# Patient Record
Sex: MALE | Race: WHITE | NOT HISPANIC OR LATINO | Employment: OTHER | URBAN - METROPOLITAN AREA
[De-identification: names, ages, dates, MRNs, and addresses within clinical notes are randomized per-mention and may not be internally consistent; named-entity substitution may affect disease eponyms.]

---

## 2022-09-07 NOTE — PROGRESS NOTES
ASSESSMENT & PLAN  Patient Instructions     1. Right hip pain    2. Primary osteoarthritis of right hip    3. Lumbar spondylosis      -Patient has right hip pain due to arthritis and low back pain due to multilevel arthritis and degenerative disc disease  -Patient was offered formal physical therapy which she is not interested at this time  -Patient may return to the office at any time for a potential right hip intra-articular cortisone injection  -Call direct clinic number [[phone]] at any time with questions or concerns.    Albert Yeo MD [facility] Orthopedics and Sports Medicine  [facility] [place]          -----    SUBJECTIVE  Carson Zurita is a/an 71 year old male who is seen as a self referral for evaluation of right hip pain. The patient is seen by themselves.    Onset: 2 years(s) ago. Reports insidious onset without acute precipitating event.  Location of Pain: right groin, as well as right sided low back pain   Rating of Pain at worst: 6/10  Rating of Pain Currently: 0/10  Worsened by: pain comes and goes depending on the day, not necessarily worse with activities   Better with: Tylenol and rest   Treatments tried: Tylenol  Quality: sharp  Associated symptoms: no distal numbness or tingling; denies swelling or warmth  Orthopedic history: YES - Date: 40+ years of chronic low back pain, has treated in the past with lumbar injection for bulging disc. 12/30/2004 left hip CHRIS  Relevant surgical history: YES - Date: 12/30/2004 left hip CHRIS  Social history: social history: retired, smokes 1 pack per day     No past medical history on file.  Social History     Socioeconomic History     Marital status:        Patient's past medical, surgical, social, and family histories were reviewed today and no changes are noted.    REVIEW OF SYSTEMS:  10 point ROS is negative other than symptoms noted above in HPI, Past Medical History or as stated below  Constitutional: NEGATIVE for fever, chills,  "change in weight  Skin: NEGATIVE for worrisome rashes, moles or lesions  GI/: NEGATIVE for bowel or bladder changes  Neuro: NEGATIVE for weakness, dizziness or paresthesias    OBJECTIVE:  /80   Ht 1.803 m (5' 11\")   Wt 102.8 kg (226 lb 9.6 oz)   BMI 31.60 kg/m     General: healthy, alert and in no distress  HEENT: no scleral icterus or conjunctival erythema  Skin: no suspicious lesions or rash. No jaundice.  CV: no pedal edema  Resp: normal respiratory effort without conversational dyspnea   Psych: normal mood and affect  Gait: normal steady gait with appropriate coordination and balance  Neuro: Normal light sensory exam of lower extremity  MSK:  RIGHT HIP  Inspection:    No obvious deformity or asymmetry, level pelvis  Palpation:  landmarks are nontender.  Active Range of Motion:     Flexion limited by tightness, IR within normal limits, ER  within normal limits  Strength:    Flexion grossly intact, adduction grossly intact, abduction grossly intact  Special Tests:    Positive: none    Negative: Logroll, resisted gluteus medius provocation, AJ, anterior impingement (FADIR), posterior impingement (EX/AB/ER)    THORACIC/LUMBAR SPINE  Inspection:    No redness, swelling, overlying skin change, gross deformity/asymmetry, scapular winging  Palpation:    Tender about the right para lumbar muscles. Otherwise remainder of landmarks are nontender.  Range of Motion:     Lumbar flexion limited by tightness    Lumbar extension limited by tightness    Right side bend limited by tightness    Left side bend limited by tightness    Right rotation limited by tightness    Left rotation limited by tightness  Strength:    Full strength throughout hips/quads/hamstrings  Special Tests:    Positive: none  Negative: straight leg raise (bilateral)    Independent visualization of the below image:    Personal review of right hip x-ray shows moderate arthritis with joint space narrowing and small osteophytes.  No acute fracture " or dislocation.  Moderate arthritis of the SI joints and degenerative changes of the lumbar spine.        Albert Yeo MD [facility]

## 2022-09-09 ENCOUNTER — OFFICE VISIT (OUTPATIENT)
Dept: ORTHOPEDICS | Facility: CLINIC | Age: 72
End: 2022-09-09
Payer: MEDICARE

## 2022-09-09 VITALS
DIASTOLIC BLOOD PRESSURE: 80 MMHG | SYSTOLIC BLOOD PRESSURE: 139 MMHG | BODY MASS INDEX: 31.72 KG/M2 | HEIGHT: 71 IN | WEIGHT: 226.6 LBS

## 2022-09-09 DIAGNOSIS — M47.816 LUMBAR SPONDYLOSIS: ICD-10-CM

## 2022-09-09 DIAGNOSIS — M16.11 PRIMARY OSTEOARTHRITIS OF RIGHT HIP: ICD-10-CM

## 2022-09-09 DIAGNOSIS — M25.551 RIGHT HIP PAIN: Primary | ICD-10-CM

## 2022-09-09 PROCEDURE — 99204 OFFICE O/P NEW MOD 45 MIN: CPT | Performed by: FAMILY MEDICINE

## 2022-09-09 RX ORDER — GABAPENTIN 600 MG/1
1200 TABLET ORAL 3 TIMES DAILY
COMMUNITY
Start: 2022-09-04

## 2022-09-09 RX ORDER — METOPROLOL TARTRATE 25 MG/1
25 TABLET, FILM COATED ORAL 2 TIMES DAILY
COMMUNITY
Start: 2022-09-01

## 2022-09-09 RX ORDER — LISINOPRIL 10 MG/1
10 TABLET ORAL DAILY
COMMUNITY
Start: 2022-09-01

## 2022-09-09 RX ORDER — SIMVASTATIN 20 MG
20 TABLET ORAL AT BEDTIME
COMMUNITY
Start: 2022-09-01

## 2022-09-09 RX ORDER — ALLOPURINOL 300 MG/1
300 TABLET ORAL DAILY
COMMUNITY
Start: 2022-09-01

## 2022-09-09 RX ORDER — TIOTROPIUM BROMIDE 18 UG/1
18 CAPSULE ORAL; RESPIRATORY (INHALATION) DAILY
COMMUNITY
Start: 2022-04-14 | End: 2024-06-25

## 2022-09-09 RX ORDER — DULOXETIN HYDROCHLORIDE 30 MG/1
30 CAPSULE, DELAYED RELEASE ORAL DAILY
COMMUNITY
Start: 2022-07-26 | End: 2023-10-31

## 2022-09-09 RX ORDER — RIVAROXABAN 20 MG/1
20 TABLET, FILM COATED ORAL
COMMUNITY
Start: 2022-07-02

## 2022-09-09 NOTE — PATIENT INSTRUCTIONS
1. Right hip pain    2. Primary osteoarthritis of right hip    3. Lumbar spondylosis      -Patient has right hip pain due to arthritis and low back pain due to multilevel arthritis and degenerative disc disease  -Patient was offered formal physical therapy which she is not interested at this time  -Patient may return to the office at any time for a potential right hip intra-articular cortisone injection  -Call direct clinic number [[phone]] at any time with questions or concerns.    Albert Yeo MD [facility]  [facility] [place]

## 2022-09-09 NOTE — LETTER
9/9/2022         RE: Carson Zurita  [address] [address]        Dear Colleague,    Thank you for referring your patient, Carson Zurita, to the [facility] [place]. Please see a copy of my visit note below.    ASSESSMENT & PLAN  Patient Instructions     1. Right hip pain    2. Primary osteoarthritis of right hip    3. Lumbar spondylosis      -Patient has right hip pain due to arthritis and low back pain due to multilevel arthritis and degenerative disc disease  -Patient was offered formal physical therapy which she is not interested at this time  -Patient may return to the office at any time for a potential right hip intra-articular cortisone injection  -Call direct clinic number [[phone]] at any time with questions or concerns.    Albert Yeo MD [facility] Orthopedics and Sports Medicine  [facility] [place]          -----    SUBJECTIVE  Carson Zurita is a/an 71 year old male who is seen as a self referral for evaluation of right hip pain. The patient is seen by themselves.    Onset: 2 years(s) ago. Reports insidious onset without acute precipitating event.  Location of Pain: right groin, as well as right sided low back pain   Rating of Pain at worst: 6/10  Rating of Pain Currently: 0/10  Worsened by: pain comes and goes depending on the day, not necessarily worse with activities   Better with: Tylenol and rest   Treatments tried: Tylenol  Quality: sharp  Associated symptoms: no distal numbness or tingling; denies swelling or warmth  Orthopedic history: YES - Date: 40+ years of chronic low back pain, has treated in the past with lumbar injection for bulging disc. 12/30/2004 left hip CHRIS  Relevant surgical history: YES - Date: 12/30/2004 left hip CHRIS  Social history: social history: retired, smokes 1 pack per day     No past medical history on file.  Social History     Socioeconomic History     Marital status:        Patient's past  "medical, surgical, social, and family histories were reviewed today and no changes are noted.    REVIEW OF SYSTEMS:  10 point ROS is negative other than symptoms noted above in HPI, Past Medical History or as stated below  Constitutional: NEGATIVE for fever, chills, change in weight  Skin: NEGATIVE for worrisome rashes, moles or lesions  GI/: NEGATIVE for bowel or bladder changes  Neuro: NEGATIVE for weakness, dizziness or paresthesias    OBJECTIVE:  /80   Ht 1.803 m (5' 11\")   Wt 102.8 kg (226 lb 9.6 oz)   BMI 31.60 kg/m     General: healthy, alert and in no distress  HEENT: no scleral icterus or conjunctival erythema  Skin: no suspicious lesions or rash. No jaundice.  CV: no pedal edema  Resp: normal respiratory effort without conversational dyspnea   Psych: normal mood and affect  Gait: normal steady gait with appropriate coordination and balance  Neuro: Normal light sensory exam of lower extremity  MSK:  RIGHT HIP  Inspection:    No obvious deformity or asymmetry, level pelvis  Palpation:  landmarks are nontender.  Active Range of Motion:     Flexion limited by tightness, IR within normal limits, ER  within normal limits  Strength:    Flexion grossly intact, adduction grossly intact, abduction grossly intact  Special Tests:    Positive: none    Negative: Logroll, resisted gluteus medius provocation, AJ, anterior impingement (FADIR), posterior impingement (EX/AB/ER)    THORACIC/LUMBAR SPINE  Inspection:    No redness, swelling, overlying skin change, gross deformity/asymmetry, scapular winging  Palpation:    Tender about the right para lumbar muscles. Otherwise remainder of landmarks are nontender.  Range of Motion:     Lumbar flexion limited by tightness    Lumbar extension limited by tightness    Right side bend limited by tightness    Left side bend limited by tightness    Right rotation limited by tightness    Left rotation limited by tightness  Strength:    Full strength throughout " hips/quads/hamstrings  Special Tests:    Positive: none  Negative: straight leg raise (bilateral)    Independent visualization of the below image:    Personal review of right hip x-ray shows moderate arthritis with joint space narrowing and small osteophytes.  No acute fracture or dislocation.  Moderate arthritis of the SI joints and degenerative changes of the lumbar spine.        Albert Yeo MD [facility]        Again, thank you for allowing me to participate in the care of your patient.        Sincerely,        Albert Yeo, MD

## 2023-09-26 RX ORDER — FERROUS SULFATE 325(65) MG
325 TABLET ORAL
COMMUNITY

## 2023-09-26 RX ORDER — CHLORAL HYDRATE 500 MG
1 CAPSULE ORAL 2 TIMES DAILY
COMMUNITY

## 2023-09-26 RX ORDER — MULTIVITAMIN
1 TABLET ORAL DAILY
COMMUNITY

## 2023-09-26 RX ORDER — LANOLIN ALCOHOL/MO/W.PET/CERES
400 CREAM (GRAM) TOPICAL DAILY
COMMUNITY

## 2023-10-25 NOTE — OR NURSING
Message left with Destiney with Dr. Soy Sanchez H & P is from 9/14, pt fell and surgery rescheduled for 11/1.  Pt stated Dr. Sanchez noted he did not need another preop.  Message left with [facility] Destiney to verify plan for H & P.  Informed patient of requirement within 30 days.

## 2023-10-31 RX ORDER — DULOXETIN HYDROCHLORIDE 60 MG/1
60 CAPSULE, DELAYED RELEASE ORAL DAILY
COMMUNITY

## 2023-10-31 NOTE — PHARMACY-ADMISSION MEDICATION HISTORY
Med rec completed by PTA RN, TRICIA Dewitt.    Pre-op physical dosing did not match PTA med list for duloxetine, gabapentin or omega-3.  Phoned pt and updated dosing on the 3 above meds.    Prior to Admission medications    Medication Sig Last Dose Taking? Auth Provider Long Term End Date   allopurinol (ZYLOPRIM) 300 MG tablet Take 300 mg by mouth daily  Yes Reported, Patient     cholecalciferol (VITAMIN D3) 25 mcg (1000 units) capsule Take 1 capsule by mouth daily  Yes Reported, Patient     DULoxetine (CYMBALTA) 60 MG capsule Take 60 mg by mouth daily  Yes Unknown, Entered By History No    ferrous sulfate (FEROSUL) 325 (65 Fe) MG tablet Take 325 mg by mouth daily (with breakfast)  Yes Reported, Patient     folic acid (FOLVITE) 400 MCG tablet Take 400 mcg by mouth daily  Yes Reported, Patient     gabapentin (NEURONTIN) 600 MG tablet Take 1,200 mg by mouth 3 times daily  Yes Reported, Patient Yes    lisinopril (ZESTRIL) 10 MG tablet Take 10 mg by mouth daily  Yes Reported, Patient Yes    metoprolol tartrate (LOPRESSOR) 25 MG tablet Take 25 mg by mouth 2 times daily  Yes Reported, Patient Yes    Multiple Vitamin (ONE-A-DAY ESSENTIAL) TABS Take 1 tablet by mouth daily  Yes Reported, Patient     Omega-3 1000 MG capsule Take 1 g by mouth 2 times daily  Yes Reported, Patient     simvastatin (ZOCOR) 20 MG tablet Take 20 mg by mouth At Bedtime  Yes Reported, Patient Yes    SPIRIVA HANDIHALER 18 MCG inhaled capsule Inhale 18 mcg into the lungs daily  Yes Reported, Patient Yes    XARELTO ANTICOAGULANT 20 MG TABS tablet Take 20 mg by mouth daily (with dinner)  Yes Reported, Patient Yes

## 2023-11-01 ENCOUNTER — ANESTHESIA EVENT (OUTPATIENT)
Dept: SURGERY | Facility: CLINIC | Age: 73
End: 2023-11-01
Payer: MEDICARE

## 2023-11-01 ENCOUNTER — APPOINTMENT (OUTPATIENT)
Dept: GENERAL RADIOLOGY | Facility: CLINIC | Age: 73
End: 2023-11-01
Payer: MEDICARE

## 2023-11-01 ENCOUNTER — HOSPITAL ENCOUNTER (OUTPATIENT)
Facility: CLINIC | Age: 73
Discharge: HOME | End: 2023-11-02
Attending: STUDENT IN AN ORGANIZED HEALTH CARE EDUCATION/TRAINING PROGRAM | Admitting: STUDENT IN AN ORGANIZED HEALTH CARE EDUCATION/TRAINING PROGRAM
Payer: MEDICARE

## 2023-11-01 ENCOUNTER — ANESTHESIA (OUTPATIENT)
Dept: SURGERY | Facility: CLINIC | Age: 73
End: 2023-11-01
Payer: MEDICARE

## 2023-11-01 DIAGNOSIS — Z96.612 S/P REVERSE TOTAL SHOULDER ARTHROPLASTY, LEFT: Primary | ICD-10-CM

## 2023-11-01 PROBLEM — Z96.619 S/P REVERSE TOTAL SHOULDER ARTHROPLASTY: Status: ACTIVE | Noted: 2023-11-01

## 2023-11-01 LAB — GLUCOSE BLDC GLUCOMTR-MCNC: 100 MG/DL (ref 70–99)

## 2023-11-01 PROCEDURE — 258N000001 HC RX 258: Performed by: STUDENT IN AN ORGANIZED HEALTH CARE EDUCATION/TRAINING PROGRAM

## 2023-11-01 PROCEDURE — 258N000003 HC RX IP 258 OP 636

## 2023-11-01 PROCEDURE — C1713 ANCHOR/SCREW BN/BN,TIS/BN: HCPCS | Performed by: STUDENT IN AN ORGANIZED HEALTH CARE EDUCATION/TRAINING PROGRAM

## 2023-11-01 PROCEDURE — 999N000141 HC STATISTIC PRE-PROCEDURE NURSING ASSESSMENT: Performed by: STUDENT IN AN ORGANIZED HEALTH CARE EDUCATION/TRAINING PROGRAM

## 2023-11-01 PROCEDURE — 250N000011 HC RX IP 250 OP 636

## 2023-11-01 PROCEDURE — 370N000017 HC ANESTHESIA TECHNICAL FEE, PER MIN: Performed by: STUDENT IN AN ORGANIZED HEALTH CARE EDUCATION/TRAINING PROGRAM

## 2023-11-01 PROCEDURE — 710N000009 HC RECOVERY PHASE 1, LEVEL 1, PER MIN: Performed by: STUDENT IN AN ORGANIZED HEALTH CARE EDUCATION/TRAINING PROGRAM

## 2023-11-01 PROCEDURE — 258N000003 HC RX IP 258 OP 636: Performed by: ANESTHESIOLOGY

## 2023-11-01 PROCEDURE — 360N000077 HC SURGERY LEVEL 4, PER MIN: Performed by: STUDENT IN AN ORGANIZED HEALTH CARE EDUCATION/TRAINING PROGRAM

## 2023-11-01 PROCEDURE — 250N000011 HC RX IP 250 OP 636: Mod: JZ | Performed by: STUDENT IN AN ORGANIZED HEALTH CARE EDUCATION/TRAINING PROGRAM

## 2023-11-01 PROCEDURE — 272N000002 HC OR SUPPLY OTHER OPNP: Performed by: STUDENT IN AN ORGANIZED HEALTH CARE EDUCATION/TRAINING PROGRAM

## 2023-11-01 PROCEDURE — 250N000009 HC RX 250

## 2023-11-01 PROCEDURE — 99203 OFFICE O/P NEW LOW 30 MIN: CPT | Performed by: PHYSICIAN ASSISTANT

## 2023-11-01 PROCEDURE — 999N000065 XR SHOULDER LEFT PORT G/E 2 VIEWS: Mod: LT

## 2023-11-01 PROCEDURE — 250N000011 HC RX IP 250 OP 636: Performed by: ANESTHESIOLOGY

## 2023-11-01 PROCEDURE — 250N000009 HC RX 250: Performed by: ANESTHESIOLOGY

## 2023-11-01 PROCEDURE — 250N000013 HC RX MED GY IP 250 OP 250 PS 637: Performed by: PHYSICIAN ASSISTANT

## 2023-11-01 PROCEDURE — 250N000013 HC RX MED GY IP 250 OP 250 PS 637

## 2023-11-01 PROCEDURE — 272N000001 HC OR GENERAL SUPPLY STERILE: Performed by: STUDENT IN AN ORGANIZED HEALTH CARE EDUCATION/TRAINING PROGRAM

## 2023-11-01 PROCEDURE — 271N000001 HC OR GENERAL SUPPLY NON-STERILE: Performed by: STUDENT IN AN ORGANIZED HEALTH CARE EDUCATION/TRAINING PROGRAM

## 2023-11-01 PROCEDURE — 82962 GLUCOSE BLOOD TEST: CPT

## 2023-11-01 PROCEDURE — L3670 SO ACRO/CLAV CAN WEB PRE OTS: HCPCS

## 2023-11-01 PROCEDURE — 88331 PATH CONSLTJ SURG 1 BLK 1SPC: CPT | Mod: TC | Performed by: STUDENT IN AN ORGANIZED HEALTH CARE EDUCATION/TRAINING PROGRAM

## 2023-11-01 PROCEDURE — 250N000025 HC SEVOFLURANE, PER MIN: Performed by: STUDENT IN AN ORGANIZED HEALTH CARE EDUCATION/TRAINING PROGRAM

## 2023-11-01 PROCEDURE — C1776 JOINT DEVICE (IMPLANTABLE): HCPCS | Performed by: STUDENT IN AN ORGANIZED HEALTH CARE EDUCATION/TRAINING PROGRAM

## 2023-11-01 DEVICE — IMPLANTABLE DEVICE
Type: IMPLANTABLE DEVICE | Site: SHOULDER | Status: FUNCTIONAL
Brand: EQUINOXE

## 2023-11-01 DEVICE — GLENOID PLATE
Type: IMPLANTABLE DEVICE | Site: SHOULDER | Status: FUNCTIONAL
Brand: EQUINOXE

## 2023-11-01 DEVICE — HUMERAL LINER
Type: IMPLANTABLE DEVICE | Site: SHOULDER | Status: FUNCTIONAL
Brand: EQUINOXE

## 2023-11-01 DEVICE — IMPLANTABLE DEVICE: Type: IMPLANTABLE DEVICE | Site: SHOULDER | Status: FUNCTIONAL

## 2023-11-01 DEVICE — GLND KWIRE
Type: IMPLANTABLE DEVICE | Site: SHOULDER | Status: FUNCTIONAL
Brand: EQUINOXE

## 2023-11-01 DEVICE — GLENOSPHERE, LATERALIZED
Type: IMPLANTABLE DEVICE | Site: SHOULDER | Status: FUNCTIONAL
Brand: EQUINOXE

## 2023-11-01 DEVICE — DBL LOADED 4.75MM BIO-COMP SWVLK
Type: IMPLANTABLE DEVICE | Site: SHOULDER | Status: FUNCTIONAL
Brand: ARTHREX®

## 2023-11-01 RX ORDER — PROCHLORPERAZINE MALEATE 5 MG
5 TABLET ORAL EVERY 6 HOURS PRN
Status: DISCONTINUED | OUTPATIENT
Start: 2023-11-01 | End: 2023-11-02 | Stop reason: HOSPADM

## 2023-11-01 RX ORDER — MEPERIDINE HYDROCHLORIDE 25 MG/ML
12.5 INJECTION INTRAMUSCULAR; INTRAVENOUS; SUBCUTANEOUS EVERY 5 MIN PRN
Status: DISCONTINUED | OUTPATIENT
Start: 2023-11-01 | End: 2023-11-01 | Stop reason: HOSPADM

## 2023-11-01 RX ORDER — CEFAZOLIN SODIUM/WATER 2 G/20 ML
2 SYRINGE (ML) INTRAVENOUS SEE ADMIN INSTRUCTIONS
Status: DISCONTINUED | OUTPATIENT
Start: 2023-11-01 | End: 2023-11-01 | Stop reason: HOSPADM

## 2023-11-01 RX ORDER — ONDANSETRON 2 MG/ML
4 INJECTION INTRAMUSCULAR; INTRAVENOUS EVERY 30 MIN PRN
Status: DISCONTINUED | OUTPATIENT
Start: 2023-11-01 | End: 2023-11-01 | Stop reason: HOSPADM

## 2023-11-01 RX ORDER — CEFAZOLIN SODIUM 2 G/100ML
2 INJECTION, SOLUTION INTRAVENOUS EVERY 8 HOURS
Status: COMPLETED | OUTPATIENT
Start: 2023-11-01 | End: 2023-11-02

## 2023-11-01 RX ORDER — ONDANSETRON 2 MG/ML
4 INJECTION INTRAMUSCULAR; INTRAVENOUS EVERY 6 HOURS PRN
Status: DISCONTINUED | OUTPATIENT
Start: 2023-11-01 | End: 2023-11-02 | Stop reason: HOSPADM

## 2023-11-01 RX ORDER — FERROUS SULFATE 325(65) MG
325 TABLET ORAL
Status: DISCONTINUED | OUTPATIENT
Start: 2023-11-02 | End: 2023-11-02 | Stop reason: HOSPADM

## 2023-11-01 RX ORDER — METHOCARBAMOL 500 MG/1
500 TABLET, FILM COATED ORAL EVERY 6 HOURS PRN
Status: DISCONTINUED | OUTPATIENT
Start: 2023-11-01 | End: 2023-11-02 | Stop reason: HOSPADM

## 2023-11-01 RX ORDER — LISINOPRIL 10 MG/1
10 TABLET ORAL DAILY
Status: DISCONTINUED | OUTPATIENT
Start: 2023-11-02 | End: 2023-11-02 | Stop reason: HOSPADM

## 2023-11-01 RX ORDER — ACETAMINOPHEN 325 MG/1
975 TABLET ORAL EVERY 8 HOURS
Status: DISCONTINUED | OUTPATIENT
Start: 2023-11-01 | End: 2023-11-02 | Stop reason: HOSPADM

## 2023-11-01 RX ORDER — LIDOCAINE 40 MG/G
CREAM TOPICAL
Status: DISCONTINUED | OUTPATIENT
Start: 2023-11-01 | End: 2023-11-02 | Stop reason: HOSPADM

## 2023-11-01 RX ORDER — OXYCODONE HYDROCHLORIDE 5 MG/1
10 TABLET ORAL EVERY 4 HOURS PRN
Status: DISCONTINUED | OUTPATIENT
Start: 2023-11-01 | End: 2023-11-01 | Stop reason: HOSPADM

## 2023-11-01 RX ORDER — FENTANYL CITRATE 50 UG/ML
INJECTION, SOLUTION INTRAMUSCULAR; INTRAVENOUS PRN
Status: DISCONTINUED | OUTPATIENT
Start: 2023-11-01 | End: 2023-11-01

## 2023-11-01 RX ORDER — BISACODYL 10 MG
10 SUPPOSITORY, RECTAL RECTAL DAILY PRN
Status: DISCONTINUED | OUTPATIENT
Start: 2023-11-01 | End: 2023-11-02 | Stop reason: HOSPADM

## 2023-11-01 RX ORDER — ALBUTEROL SULFATE 5 MG/ML
2.5 SOLUTION RESPIRATORY (INHALATION) EVERY 6 HOURS PRN
Status: DISCONTINUED | OUTPATIENT
Start: 2023-11-01 | End: 2023-11-02 | Stop reason: HOSPADM

## 2023-11-01 RX ORDER — METOPROLOL TARTRATE 25 MG/1
25 TABLET, FILM COATED ORAL 2 TIMES DAILY
Status: DISCONTINUED | OUTPATIENT
Start: 2023-11-01 | End: 2023-11-02 | Stop reason: HOSPADM

## 2023-11-01 RX ORDER — ONDANSETRON 4 MG/1
4 TABLET, ORALLY DISINTEGRATING ORAL EVERY 30 MIN PRN
Status: DISCONTINUED | OUTPATIENT
Start: 2023-11-01 | End: 2023-11-01 | Stop reason: HOSPADM

## 2023-11-01 RX ORDER — DIAZEPAM 10 MG/2ML
2.5 INJECTION, SOLUTION INTRAMUSCULAR; INTRAVENOUS
Status: DISCONTINUED | OUTPATIENT
Start: 2023-11-01 | End: 2023-11-01 | Stop reason: HOSPADM

## 2023-11-01 RX ORDER — CEFAZOLIN SODIUM/WATER 2 G/20 ML
2 SYRINGE (ML) INTRAVENOUS EVERY 8 HOURS
Qty: 40 ML | Refills: 0 | Status: DISCONTINUED | OUTPATIENT
Start: 2023-11-01 | End: 2023-11-01

## 2023-11-01 RX ORDER — SODIUM CHLORIDE, SODIUM LACTATE, POTASSIUM CHLORIDE, CALCIUM CHLORIDE 600; 310; 30; 20 MG/100ML; MG/100ML; MG/100ML; MG/100ML
INJECTION, SOLUTION INTRAVENOUS CONTINUOUS
Status: DISCONTINUED | OUTPATIENT
Start: 2023-11-01 | End: 2023-11-01 | Stop reason: HOSPADM

## 2023-11-01 RX ORDER — ONDANSETRON 4 MG/1
4 TABLET, ORALLY DISINTEGRATING ORAL EVERY 6 HOURS PRN
Status: DISCONTINUED | OUTPATIENT
Start: 2023-11-01 | End: 2023-11-02 | Stop reason: HOSPADM

## 2023-11-01 RX ORDER — CEFAZOLIN SODIUM/WATER 2 G/20 ML
2 SYRINGE (ML) INTRAVENOUS
Status: DISCONTINUED | OUTPATIENT
Start: 2023-11-01 | End: 2023-11-01 | Stop reason: HOSPADM

## 2023-11-01 RX ORDER — PROPOFOL 10 MG/ML
INJECTION, EMULSION INTRAVENOUS PRN
Status: DISCONTINUED | OUTPATIENT
Start: 2023-11-01 | End: 2023-11-01

## 2023-11-01 RX ORDER — LIDOCAINE HYDROCHLORIDE 20 MG/ML
INJECTION, SOLUTION INFILTRATION; PERINEURAL PRN
Status: DISCONTINUED | OUTPATIENT
Start: 2023-11-01 | End: 2023-11-01

## 2023-11-01 RX ORDER — METHOCARBAMOL 500 MG/1
500 TABLET, FILM COATED ORAL 4 TIMES DAILY
Status: DISCONTINUED | OUTPATIENT
Start: 2023-11-01 | End: 2023-11-02 | Stop reason: HOSPADM

## 2023-11-01 RX ORDER — ONDANSETRON 4 MG/1
4 TABLET, ORALLY DISINTEGRATING ORAL EVERY 8 HOURS PRN
Qty: 10 TABLET | Refills: 0 | Status: SHIPPED | OUTPATIENT
Start: 2023-11-01

## 2023-11-01 RX ORDER — ACETAMINOPHEN 325 MG/1
650 TABLET ORAL EVERY 4 HOURS PRN
Qty: 100 TABLET | Refills: 0 | Status: SHIPPED | OUTPATIENT
Start: 2023-11-01

## 2023-11-01 RX ORDER — NALOXONE HYDROCHLORIDE 0.4 MG/ML
0.2 INJECTION, SOLUTION INTRAMUSCULAR; INTRAVENOUS; SUBCUTANEOUS
Status: DISCONTINUED | OUTPATIENT
Start: 2023-11-01 | End: 2023-11-02 | Stop reason: HOSPADM

## 2023-11-01 RX ORDER — POLYETHYLENE GLYCOL 3350 17 G/17G
17 POWDER, FOR SOLUTION ORAL DAILY
Status: DISCONTINUED | OUTPATIENT
Start: 2023-11-02 | End: 2023-11-02 | Stop reason: HOSPADM

## 2023-11-01 RX ORDER — HYDROMORPHONE HCL IN WATER/PF 6 MG/30 ML
0.4 PATIENT CONTROLLED ANALGESIA SYRINGE INTRAVENOUS
Status: DISCONTINUED | OUTPATIENT
Start: 2023-11-01 | End: 2023-11-02 | Stop reason: HOSPADM

## 2023-11-01 RX ORDER — GABAPENTIN 600 MG/1
1200 TABLET ORAL 3 TIMES DAILY
Status: DISCONTINUED | OUTPATIENT
Start: 2023-11-01 | End: 2023-11-02 | Stop reason: HOSPADM

## 2023-11-01 RX ORDER — IPRATROPIUM BROMIDE AND ALBUTEROL SULFATE 2.5; .5 MG/3ML; MG/3ML
3 SOLUTION RESPIRATORY (INHALATION) AT BEDTIME
Status: DISCONTINUED | OUTPATIENT
Start: 2023-11-01 | End: 2023-11-02 | Stop reason: HOSPADM

## 2023-11-01 RX ORDER — TRANEXAMIC ACID 10 MG/ML
1 INJECTION, SOLUTION INTRAVENOUS ONCE
Status: COMPLETED | OUTPATIENT
Start: 2023-11-01 | End: 2023-11-01

## 2023-11-01 RX ORDER — NALOXONE HYDROCHLORIDE 0.4 MG/ML
0.4 INJECTION, SOLUTION INTRAMUSCULAR; INTRAVENOUS; SUBCUTANEOUS
Status: DISCONTINUED | OUTPATIENT
Start: 2023-11-01 | End: 2023-11-02 | Stop reason: HOSPADM

## 2023-11-01 RX ORDER — TRANEXAMIC ACID 10 MG/ML
1 INJECTION, SOLUTION INTRAVENOUS ONCE
Status: DISCONTINUED | OUTPATIENT
Start: 2023-11-01 | End: 2023-11-01 | Stop reason: HOSPADM

## 2023-11-01 RX ORDER — DULOXETIN HYDROCHLORIDE 30 MG/1
60 CAPSULE, DELAYED RELEASE ORAL EVERY EVENING
Status: DISCONTINUED | OUTPATIENT
Start: 2023-11-01 | End: 2023-11-02 | Stop reason: HOSPADM

## 2023-11-01 RX ORDER — AMOXICILLIN 250 MG
1 CAPSULE ORAL 2 TIMES DAILY
Status: DISCONTINUED | OUTPATIENT
Start: 2023-11-01 | End: 2023-11-02 | Stop reason: HOSPADM

## 2023-11-01 RX ORDER — GLYCOPYRROLATE 0.2 MG/ML
INJECTION, SOLUTION INTRAMUSCULAR; INTRAVENOUS PRN
Status: DISCONTINUED | OUTPATIENT
Start: 2023-11-01 | End: 2023-11-01

## 2023-11-01 RX ORDER — ONDANSETRON 2 MG/ML
INJECTION INTRAMUSCULAR; INTRAVENOUS PRN
Status: DISCONTINUED | OUTPATIENT
Start: 2023-11-01 | End: 2023-11-01

## 2023-11-01 RX ORDER — ACETAMINOPHEN 325 MG/1
650 TABLET ORAL EVERY 4 HOURS PRN
Status: DISCONTINUED | OUTPATIENT
Start: 2023-11-04 | End: 2023-11-02 | Stop reason: HOSPADM

## 2023-11-01 RX ORDER — OXYCODONE HYDROCHLORIDE 5 MG/1
10 TABLET ORAL EVERY 4 HOURS PRN
Status: DISCONTINUED | OUTPATIENT
Start: 2023-11-01 | End: 2023-11-02 | Stop reason: HOSPADM

## 2023-11-01 RX ORDER — TIOTROPIUM BROMIDE 18 UG/1
18 CAPSULE ORAL; RESPIRATORY (INHALATION) DAILY
Status: DISCONTINUED | OUTPATIENT
Start: 2023-11-02 | End: 2023-11-01

## 2023-11-01 RX ORDER — FENTANYL CITRATE 50 UG/ML
25 INJECTION, SOLUTION INTRAMUSCULAR; INTRAVENOUS
Status: DISCONTINUED | OUTPATIENT
Start: 2023-11-01 | End: 2023-11-01 | Stop reason: HOSPADM

## 2023-11-01 RX ORDER — ALBUTEROL SULFATE 0.83 MG/ML
2.5 SOLUTION RESPIRATORY (INHALATION) EVERY 4 HOURS PRN
Status: DISCONTINUED | OUTPATIENT
Start: 2023-11-01 | End: 2023-11-01 | Stop reason: HOSPADM

## 2023-11-01 RX ORDER — BUPIVACAINE HYDROCHLORIDE 5 MG/ML
INJECTION, SOLUTION EPIDURAL; INTRACAUDAL
Status: COMPLETED | OUTPATIENT
Start: 2023-11-01 | End: 2023-11-01

## 2023-11-01 RX ORDER — IBUPROFEN 600 MG/1
600 TABLET, FILM COATED ORAL EVERY 6 HOURS PRN
Status: DISCONTINUED | OUTPATIENT
Start: 2023-11-01 | End: 2023-11-02 | Stop reason: HOSPADM

## 2023-11-01 RX ORDER — AMOXICILLIN 250 MG
1-2 CAPSULE ORAL 2 TIMES DAILY
Qty: 30 TABLET | Refills: 0 | Status: SHIPPED | OUTPATIENT
Start: 2023-11-01

## 2023-11-01 RX ORDER — HYDROMORPHONE HCL IN WATER/PF 6 MG/30 ML
0.2 PATIENT CONTROLLED ANALGESIA SYRINGE INTRAVENOUS
Status: DISCONTINUED | OUTPATIENT
Start: 2023-11-01 | End: 2023-11-02 | Stop reason: HOSPADM

## 2023-11-01 RX ORDER — SODIUM CHLORIDE, SODIUM LACTATE, POTASSIUM CHLORIDE, CALCIUM CHLORIDE 600; 310; 30; 20 MG/100ML; MG/100ML; MG/100ML; MG/100ML
INJECTION, SOLUTION INTRAVENOUS CONTINUOUS
Status: DISCONTINUED | OUTPATIENT
Start: 2023-11-01 | End: 2023-11-02 | Stop reason: HOSPADM

## 2023-11-01 RX ORDER — HYDROMORPHONE HCL IN WATER/PF 6 MG/30 ML
0.4 PATIENT CONTROLLED ANALGESIA SYRINGE INTRAVENOUS EVERY 5 MIN PRN
Status: DISCONTINUED | OUTPATIENT
Start: 2023-11-01 | End: 2023-11-01 | Stop reason: HOSPADM

## 2023-11-01 RX ORDER — VANCOMYCIN HYDROCHLORIDE 1 G/20ML
INJECTION, POWDER, LYOPHILIZED, FOR SOLUTION INTRAVENOUS PRN
Status: DISCONTINUED | OUTPATIENT
Start: 2023-11-01 | End: 2023-11-01 | Stop reason: HOSPADM

## 2023-11-01 RX ORDER — LABETALOL HYDROCHLORIDE 5 MG/ML
10 INJECTION, SOLUTION INTRAVENOUS EVERY 10 MIN PRN
Status: DISCONTINUED | OUTPATIENT
Start: 2023-11-01 | End: 2023-11-01 | Stop reason: HOSPADM

## 2023-11-01 RX ORDER — IBUPROFEN 600 MG/1
600 TABLET, FILM COATED ORAL EVERY 6 HOURS PRN
Qty: 30 TABLET | Refills: 0 | Status: SHIPPED | OUTPATIENT
Start: 2023-11-01

## 2023-11-01 RX ORDER — DEXAMETHASONE SODIUM PHOSPHATE 4 MG/ML
4 INJECTION, SOLUTION INTRA-ARTICULAR; INTRALESIONAL; INTRAMUSCULAR; INTRAVENOUS; SOFT TISSUE
Status: DISCONTINUED | OUTPATIENT
Start: 2023-11-01 | End: 2023-11-01 | Stop reason: HOSPADM

## 2023-11-01 RX ORDER — LIDOCAINE 40 MG/G
CREAM TOPICAL
Status: DISCONTINUED | OUTPATIENT
Start: 2023-11-01 | End: 2023-11-01 | Stop reason: HOSPADM

## 2023-11-01 RX ORDER — OXYCODONE HYDROCHLORIDE 5 MG/1
5 TABLET ORAL EVERY 4 HOURS PRN
Status: DISCONTINUED | OUTPATIENT
Start: 2023-11-01 | End: 2023-11-02 | Stop reason: HOSPADM

## 2023-11-01 RX ORDER — DEXAMETHASONE SODIUM PHOSPHATE 4 MG/ML
INJECTION, SOLUTION INTRA-ARTICULAR; INTRALESIONAL; INTRAMUSCULAR; INTRAVENOUS; SOFT TISSUE PRN
Status: DISCONTINUED | OUTPATIENT
Start: 2023-11-01 | End: 2023-11-01

## 2023-11-01 RX ORDER — FENTANYL CITRATE 50 UG/ML
25 INJECTION, SOLUTION INTRAMUSCULAR; INTRAVENOUS EVERY 5 MIN PRN
Status: DISCONTINUED | OUTPATIENT
Start: 2023-11-01 | End: 2023-11-01 | Stop reason: HOSPADM

## 2023-11-01 RX ORDER — HYDROMORPHONE HCL IN WATER/PF 6 MG/30 ML
0.2 PATIENT CONTROLLED ANALGESIA SYRINGE INTRAVENOUS EVERY 5 MIN PRN
Status: DISCONTINUED | OUTPATIENT
Start: 2023-11-01 | End: 2023-11-01 | Stop reason: HOSPADM

## 2023-11-01 RX ORDER — OXYCODONE HYDROCHLORIDE 5 MG/1
5-10 TABLET ORAL EVERY 4 HOURS PRN
Qty: 20 TABLET | Refills: 0 | Status: SHIPPED | OUTPATIENT
Start: 2023-11-01

## 2023-11-01 RX ORDER — FENTANYL CITRATE 50 UG/ML
50 INJECTION, SOLUTION INTRAMUSCULAR; INTRAVENOUS EVERY 5 MIN PRN
Status: DISCONTINUED | OUTPATIENT
Start: 2023-11-01 | End: 2023-11-01 | Stop reason: HOSPADM

## 2023-11-01 RX ORDER — OXYCODONE HYDROCHLORIDE 5 MG/1
5 TABLET ORAL EVERY 4 HOURS PRN
Status: DISCONTINUED | OUTPATIENT
Start: 2023-11-01 | End: 2023-11-01 | Stop reason: HOSPADM

## 2023-11-01 RX ADMIN — PHENYLEPHRINE HYDROCHLORIDE 100 MCG: 10 INJECTION INTRAVENOUS at 09:27

## 2023-11-01 RX ADMIN — NICOTINE 1 PATCH: 7 PATCH, EXTENDED RELEASE TRANSDERMAL at 16:24

## 2023-11-01 RX ADMIN — FENTANYL CITRATE 50 MCG: 50 INJECTION INTRAMUSCULAR; INTRAVENOUS at 08:14

## 2023-11-01 RX ADMIN — PHENYLEPHRINE HYDROCHLORIDE 100 MCG: 10 INJECTION INTRAVENOUS at 09:33

## 2023-11-01 RX ADMIN — OXYCODONE HYDROCHLORIDE 5 MG: 5 TABLET ORAL at 16:25

## 2023-11-01 RX ADMIN — SODIUM CHLORIDE, POTASSIUM CHLORIDE, SODIUM LACTATE AND CALCIUM CHLORIDE: 600; 310; 30; 20 INJECTION, SOLUTION INTRAVENOUS at 13:29

## 2023-11-01 RX ADMIN — SODIUM CHLORIDE, POTASSIUM CHLORIDE, SODIUM LACTATE AND CALCIUM CHLORIDE: 600; 310; 30; 20 INJECTION, SOLUTION INTRAVENOUS at 06:32

## 2023-11-01 RX ADMIN — BENZOCAINE 6 MG-MENTHOL 10 MG LOZENGES 1 LOZENGE: at 20:25

## 2023-11-01 RX ADMIN — ACETAMINOPHEN 975 MG: 325 TABLET, FILM COATED ORAL at 20:22

## 2023-11-01 RX ADMIN — TRANEXAMIC ACID 1 G: 10 INJECTION, SOLUTION INTRAVENOUS at 07:46

## 2023-11-01 RX ADMIN — METHOCARBAMOL 500 MG: 500 TABLET ORAL at 20:24

## 2023-11-01 RX ADMIN — ONDANSETRON 4 MG: 2 INJECTION INTRAMUSCULAR; INTRAVENOUS at 10:02

## 2023-11-01 RX ADMIN — SENNOSIDES AND DOCUSATE SODIUM 1 TABLET: 8.6; 5 TABLET ORAL at 20:23

## 2023-11-01 RX ADMIN — ACETAMINOPHEN 975 MG: 325 TABLET, FILM COATED ORAL at 12:54

## 2023-11-01 RX ADMIN — BUPIVACAINE HYDROCHLORIDE 20 ML: 5 INJECTION, SOLUTION EPIDURAL; INTRACAUDAL at 07:14

## 2023-11-01 RX ADMIN — DULOXETINE HYDROCHLORIDE 60 MG: 30 CAPSULE, DELAYED RELEASE ORAL at 20:24

## 2023-11-01 RX ADMIN — PROPOFOL 150 MG: 10 INJECTION, EMULSION INTRAVENOUS at 07:35

## 2023-11-01 RX ADMIN — HYDROMORPHONE HYDROCHLORIDE 0.5 MG: 1 INJECTION, SOLUTION INTRAMUSCULAR; INTRAVENOUS; SUBCUTANEOUS at 08:40

## 2023-11-01 RX ADMIN — SODIUM CHLORIDE, POTASSIUM CHLORIDE, SODIUM LACTATE AND CALCIUM CHLORIDE: 600; 310; 30; 20 INJECTION, SOLUTION INTRAVENOUS at 07:25

## 2023-11-01 RX ADMIN — PHENYLEPHRINE HYDROCHLORIDE 50 MCG: 10 INJECTION INTRAVENOUS at 08:48

## 2023-11-01 RX ADMIN — DEXAMETHASONE SODIUM PHOSPHATE 8 MG: 4 INJECTION, SOLUTION INTRA-ARTICULAR; INTRALESIONAL; INTRAMUSCULAR; INTRAVENOUS; SOFT TISSUE at 07:37

## 2023-11-01 RX ADMIN — PHENYLEPHRINE HYDROCHLORIDE 0.2 MCG/KG/MIN: 10 INJECTION INTRAVENOUS at 08:47

## 2023-11-01 RX ADMIN — METHOCARBAMOL 500 MG: 500 TABLET ORAL at 16:14

## 2023-11-01 RX ADMIN — PHENYLEPHRINE HYDROCHLORIDE 100 MCG: 10 INJECTION INTRAVENOUS at 08:27

## 2023-11-01 RX ADMIN — LIDOCAINE HYDROCHLORIDE 50 MG: 20 INJECTION, SOLUTION INFILTRATION; PERINEURAL at 07:35

## 2023-11-01 RX ADMIN — GLYCOPYRROLATE 0.2 MG: 0.2 INJECTION, SOLUTION INTRAMUSCULAR; INTRAVENOUS at 08:16

## 2023-11-01 RX ADMIN — GABAPENTIN 1200 MG: 600 TABLET, FILM COATED ORAL at 13:24

## 2023-11-01 RX ADMIN — SODIUM CHLORIDE, POTASSIUM CHLORIDE, SODIUM LACTATE AND CALCIUM CHLORIDE: 600; 310; 30; 20 INJECTION, SOLUTION INTRAVENOUS at 09:06

## 2023-11-01 RX ADMIN — Medication 2 G: at 07:36

## 2023-11-01 RX ADMIN — FENTANYL CITRATE 50 MCG: 50 INJECTION INTRAMUSCULAR; INTRAVENOUS at 07:35

## 2023-11-01 RX ADMIN — METHOCARBAMOL 500 MG: 500 TABLET ORAL at 12:54

## 2023-11-01 RX ADMIN — CEFAZOLIN SODIUM 2 G: 2 INJECTION, SOLUTION INTRAVENOUS at 16:16

## 2023-11-01 RX ADMIN — METOPROLOL TARTRATE 25 MG: 25 TABLET, FILM COATED ORAL at 20:24

## 2023-11-01 RX ADMIN — TRANEXAMIC ACID 1 G: 10 INJECTION, SOLUTION INTRAVENOUS at 10:09

## 2023-11-01 RX ADMIN — PHENYLEPHRINE HYDROCHLORIDE 0.3 MCG/KG/MIN: 10 INJECTION INTRAVENOUS at 07:48

## 2023-11-01 RX ADMIN — SUGAMMADEX 200 MG: 100 INJECTION, SOLUTION INTRAVENOUS at 10:22

## 2023-11-01 RX ADMIN — ROCURONIUM BROMIDE 50 MG: 50 INJECTION, SOLUTION INTRAVENOUS at 07:36

## 2023-11-01 RX ADMIN — FENTANYL CITRATE 25 MCG: 50 INJECTION, SOLUTION INTRAMUSCULAR; INTRAVENOUS at 11:40

## 2023-11-01 RX ADMIN — GABAPENTIN 1200 MG: 600 TABLET, FILM COATED ORAL at 20:23

## 2023-11-01 RX ADMIN — PHENYLEPHRINE HYDROCHLORIDE 100 MCG: 10 INJECTION INTRAVENOUS at 10:00

## 2023-11-01 ASSESSMENT — ACTIVITIES OF DAILY LIVING (ADL)
ADLS_ACUITY_SCORE: 21

## 2023-11-01 ASSESSMENT — ENCOUNTER SYMPTOMS: DYSRHYTHMIAS: 1

## 2023-11-01 NOTE — CONSULTS
[facility]  Hospitalist Consult Note  Name: Carson Zurita    MRN: [id number]  YOB: 1950    Age: 72 year old  Date of admission: 11/1/2023  Primary care provider: Lidia Sunshine     Requesting Physician:  Christal Vragas PA-C  Reason for consult:  Post-operative medical management         Assessment and Plan:   Carson Zurtia is a 72 year old male with a history of atrial fibrillation, hypertension, COPD, prior tobacco use, HLD, who was admitted for left shoulder replacement due to repeated dislocation of shoulder and rotator cuff use.    Left rotator cuff arthropathy with shoulder instability and dislocations status post left reverse total shoulder arthroplasty, bicep tenodesis, on 11/1: The patient is doing well, currently has well controlled pain and is hemodynamically stable. Will defer diet, activity, DVT prophylaxis, and pain control to the primary team. Currently the patient is on Tylenol, Robaxin, gabapentin.  Continue physical and occupational therapy. Social work consulted for possible placement needs. Continue incentive spirometry and check hemoglobin to evaluate for surgical blood loss and potential need for transfusion.  Resume prior to admission Cymbalta and Neurontin.    Atrial fibrillation per patient PAF, rate controlled. No preop EKG to review. Continue prior to admission metoprolol.  Resume Xarelto when deemed low bleeding risk by orthopedic surgical team. Has been ordered to resume 11/2 PM by surgical team.     Hypertension -continue lisinopril 11/2 with hold parameters.    COPD  History of tobacco use -chronic cough.  No respiratory distress or expiratory wheezing.  Would continue prior to admission Spiriva, patient unable to supply currently ok to hold for today will give 1x duoneb at bedtime. Restart Spiriva when he returns home. Albuterol nebs ordered as needed.    APOLONIA -continue CPAP nightly.    Hyperlipidemia -continue statin at discharge.        Code status: Full  per admitting   Prophylaxis: Xarelto   Disposition: Pending therapies and family support to return home.     Thank you for the consultation, we will continue to follow along during the hospitalization. Please page with any questions or concerns.         History of Present Illness:   Carson Zurita is a 72 year old male who was hospitalized for left shoulder replacement due to repeated dislocation of shoulder and rotator cuff use. Pre-operative note was fully reviewed and recommendations acknowledged. Op note and anesthesia notes and flow sheets reviewed.     The patient had no complications related to the procedure and has had an unremarkable post-operative course to this point. Currently pain is adequately controlled. No nausea, vomiting, diarrhea or constipation. No fevers, chills, diaphoresis. No chest pain, palpitations, dyspnea. Due to void. Tolerating oral intake. No excessive somnolence and patient is fully alert and oriented. The patient has no other complaints at this time.                  Past Medical History:     Past Medical History:   Diagnosis Date    Gout     Hypertension     Sleep apnea     Uses a CPAP             Past Surgical History:     Past Surgical History:   Procedure Laterality Date    ARTHROPLASTY HIP Left     CATARACT IOL, RT/LT Bilateral     COLECTOMY SUBTOTAL      SHOULDER SURGERY Left 2012               Social History:     Social History     Tobacco Use    Smoking status: Every Day     Packs/day: 1     Types: Cigarettes    Smokeless tobacco: Never   Substance Use Topics    Alcohol use: Yes     Comment: 1-2 before supper ans 1-2 after supper daily             Family History:   Family history was fully reviewed and non-contributory in this case.          Allergies:   No Known Allergies          Medications:     Prior to Admission medications    Medication Sig Last Dose Taking? Auth Provider Long Term End Date   acetaminophen (TYLENOL) 325 MG tablet Take 2 tablets (650 mg) by mouth every 4  hours as needed for other (mild pain)  Yes Christal Vargas PA-C     allopurinol (ZYLOPRIM) 300 MG tablet Take 300 mg by mouth daily 10/31/2023 at 2100 Yes Reported, Patient     cholecalciferol (VITAMIN D3) 25 mcg (1000 units) capsule Take 1 capsule by mouth daily 10/22/2023 Yes Reported, Patient     DULoxetine (CYMBALTA) 60 MG capsule Take 60 mg by mouth daily 10/31/2023 at 2100 Yes Unknown, Entered By History No    ferrous sulfate (FEROSUL) 325 (65 Fe) MG tablet Take 325 mg by mouth daily (with breakfast) 10/22/2023 Yes Reported, Patient     folic acid (FOLVITE) 400 MCG tablet Take 400 mcg by mouth daily 10/22/2023 Yes Reported, Patient     gabapentin (NEURONTIN) 600 MG tablet Take 1,200 mg by mouth 3 times daily 11/1/2023 at 0345 Yes Reported, Patient Yes    ibuprofen (ADVIL/MOTRIN) 600 MG tablet Take 1 tablet (600 mg) by mouth every 6 hours as needed for mild pain  Yes Christal Vargas PA-C     lisinopril (ZESTRIL) 10 MG tablet Take 10 mg by mouth daily 11/1/2023 at 0345 Yes Reported, Patient Yes    metoprolol tartrate (LOPRESSOR) 25 MG tablet Take 25 mg by mouth 2 times daily 11/1/2023 at 0345 Yes Reported, Patient Yes    Multiple Vitamin (ONE-A-DAY ESSENTIAL) TABS Take 1 tablet by mouth daily 10/22/2023 Yes Reported, Patient     Omega-3 1000 MG capsule Take 1 g by mouth 2 times daily 10/22/2023 Yes Reported, Patient     ondansetron (ZOFRAN ODT) 4 MG ODT tab Take 1 tablet (4 mg) by mouth every 8 hours as needed for nausea Dissolve ON the tongue.  Yes Christal Vargas PA-C     oxyCODONE (ROXICODONE) 5 MG tablet Take 1-2 tablets (5-10 mg) by mouth every 4 hours as needed for moderate to severe pain  Yes Christal Vargas PA-C     senna-docusate (SENOKOT-S/PERICOLACE) 8.6-50 MG tablet Take 1-2 tablets by mouth 2 times daily Take while on oral narcotics to prevent or treat constipation.  Yes Christal Vargas PA-C     simvastatin (ZOCOR) 20 MG tablet Take 20 mg by mouth At Bedtime 10/31/2023 at 2100 Yes  "Reported, Patient Yes    SPIRIVA HANDIHALER 18 MCG inhaled capsule Inhale 18 mcg into the lungs daily 11/1/2023 at 0345 Yes Reported, Patient Yes    XARELTO ANTICOAGULANT 20 MG TABS tablet Take 20 mg by mouth daily (with dinner) 10/26/2023 Yes Reported, Patient Yes        Current hospital administered medication list (MAR) also reviewed.          Review of Systems:     A comprehensive greater than 10 system review of systems was carried out.  Pertinent positives and negatives are noted above.  Otherwise negative for contributory info.            Physical Exam:   Blood pressure 136/80, pulse 69, temperature 96.8  F (36  C), temperature source Temporal, resp. rate 13, height 1.803 m (5' 10.98\"), weight 100.6 kg (221 lb 11.2 oz), SpO2 95%.  Exam:  General: Alert, awake, no acute distress.  HEENT: Normocephalic and atraumatic, eyes anicteric and without scleral injection, EOMI, face symmetric, MMM.  Cardiac: RRR, normal S1, S2. No m/g/r, no LE edema.  Pulmonary: Normal chest rise, normal work of breathing.  Lungs CTAB without crackles or wheezing.  Abdomen: soft, non-tender, non-distended.  Normoactive bowel sounds, no guarding or rebound tenderness.  Extremities: no deformities.  Warm, well perfused.  Skin: no rashes or lesions.  Warm and Dry.  Neuro: No focal deficits.  Speech clear.  Spontaneously moving all extremities in bed.  Psych: Alert and oriented x3. Appropriate affect.          Data:   Preop lab work reviewed showing a hemoglobin of 12.2, creatinine is normal.  No preop EKG.    Dejah Paula PA-C  [facility] Hospitalist  November 1, 2023      "

## 2023-11-01 NOTE — ANESTHESIA PROCEDURE NOTES
"Brachial plexus Procedure Note    Pre-Procedure   Staff -        Anesthesiologist:  Ant Duran MD       Performed By: anesthesiologist       Location: pre-op       Procedure Start/Stop Times: 11/1/2023 7:05 AM and 11/1/2023 7:14 AM       Pre-Anesthestic Checklist: patient identified, IV checked, site marked, risks and benefits discussed, informed consent, monitors and equipment checked, pre-op evaluation, at physician/surgeon's request and post-op pain management  Timeout:       Correct Patient: Yes        Correct Procedure: Yes        Correct Site: Yes        Correct Position: Yes        Correct Laterality: Yes        Site Marked: Yes  Procedure Documentation  Procedure: Brachial plexus       Laterality: left       Patient Position: supine       Patient Prep/Sterile Barriers: sterile gloves       Skin prep: Betadine       Local skin infiltrated with 0.5 mL of 1% lidocaine.  (interscalene approach).       Needle Type: insulated       Needle Gauge: 22.        Needle Length (Inches): 2        Ultrasound guided       1. Ultrasound was used to identify targeted nerve, plexus, vascular marker, or fascial plane and place a needle adjacent to it in real-time.       2. Ultrasound was used to visualize the spread of anesthetic in close proximity to the above referenced structure.    Assessment/Narrative         The placement was negative for: blood aspirated, painful injection and site bleeding       Paresthesias: No.       Bolus given via needle. no blood aspirated via catheter.        Secured via.        Insertion/Infusion Method: Single Shot       Complications: none    Medication(s) Administered   Bupivacaine 0.5% PF (Infiltration) - Infiltration   20 mL - 11/1/2023 7:14:00 AM  Medication Administration Time: 11/1/2023 7:05 AM      FOR [facility] ([facility]) ONLY:   Pain Team Contact information: please page the Pain Team Via [employer]. Search \"Pain\". During daytime hours, please page the attending first. At " night please page the resident first.

## 2023-11-01 NOTE — PLAN OF CARE
Goal Outcome Evaluation:      Plan of Care Reviewed With: patient, child    Overall Patient Progress: improvingOverall Patient Progress: improving     Pt arrived from pacu on a cart, assisted into bed with air aj. IV infusing into right hand, O2 on 2L NC piggybacked into capno tubing and all readings wnl. Guaze with tegaderm over left shoulder incision which was clean and dry, does have some numbness and tingling to 4th and 5th fingers moderate strength and good pulse. Sling with pillow in place, removed to apply gown and to allow him to move his elbow, wrist and hand. Chapa pulled right before being sent up to the room so due to void. Rated his pain a 5 on arrival with a goal of 3, medicated with scheduled meds: tylenol, robaxin and gabapentin. No n/v, drinking fluids, Daughter here and a second daughter will come tomorrow but no one to stay with him 24/7. OT tomorrow.

## 2023-11-01 NOTE — PLAN OF CARE
Goal Outcome Evaluation:                     Pt is A&OX3, on 2 L oxygen , on capno monitoring.  Incision to L/ shoulder CDI, ice  applied, sling on. Still has numbness to 4th and 5 th fingers.Radial pulse palpated. Ace wraps on to RUE.  Pt rates his pain 4-5/10. Oxycodone PO 5 mg PRN given, scheduled  Robaxin given.  Pt had dinner, consumed 100%. IV  to R/ hand CDI, LR  75 ml/hr infusing.pt stood up next to his bed and trying to void. Bladder scan was 823 ml. Pt voided 500 ml, used urinal.baseline N/T present to feet.

## 2023-11-01 NOTE — ANESTHESIA PREPROCEDURE EVALUATION
"Anesthesia Pre-Procedure Evaluation    Patient: Carson Zurita   MRN: [id number] : 1950        Procedure : Procedure(s):  Left reverse total shoulder arthroplasty          Past Medical History:   Diagnosis Date    Gout     Hypertension     Sleep apnea     Uses a CPAP      Past Surgical History:   Procedure Laterality Date    ARTHROPLASTY HIP Left     CATARACT IOL, RT/LT Bilateral     COLECTOMY SUBTOTAL      SHOULDER SURGERY Left       No Known Allergies   Social History     Tobacco Use    Smoking status: Every Day     Packs/day: 1     Types: Cigarettes    Smokeless tobacco: Never   Substance Use Topics    Alcohol use: Yes     Comment: 1-2 before supper ans 1-2 after supper daily      Wt Readings from Last 1 Encounters:   23 100.6 kg (221 lb 11.2 oz)        Anesthesia Evaluation   Pt has had prior anesthetic. Type: General.    No history of anesthetic complications       ROS/MED HX  ENT/Pulmonary:     (+) sleep apnea, uses CPAP,                                     Neurologic:  - neg neurologic ROS     Cardiovascular:  - neg cardiovascular ROS   (+)  hypertension- -   -  - -                        dysrhythmias, a-fib,             METS/Exercise Tolerance:     Hematologic:  - neg hematologic  ROS     Musculoskeletal:  - neg musculoskeletal ROS     GI/Hepatic:  - neg GI/hepatic ROS     Renal/Genitourinary:  - neg Renal ROS     Endo:  - neg endo ROS     Psychiatric/Substance Use:  - neg psychiatric ROS     Infectious Disease:  - neg infectious disease ROS     Malignancy:  - neg malignancy ROS     Other:  - neg other ROS          Physical Exam    Airway        Mallampati: II   TM distance: > 3 FB   Neck ROM: full   Mouth opening: > 3 cm    Respiratory Devices and Support         Dental  no notable dental history         Cardiovascular   cardiovascular exam normal          Pulmonary   pulmonary exam normal                OUTSIDE LABS:  CBC: No results found for: \"WBC\", \"HGB\", \"HCT\", \"PLT\"  BMP:   Lab " "Results   Component Value Date     (H) 11/01/2023     COAGS: No results found for: \"PTT\", \"INR\", \"FIBR\"  POC: No results found for: \"BGM\", \"HCG\", \"HCGS\"  HEPATIC: No results found for: \"ALBUMIN\", \"PROTTOTAL\", \"ALT\", \"AST\", \"GGT\", \"ALKPHOS\", \"BILITOTAL\", \"BILIDIRECT\", \"DIOGENES\"  OTHER: No results found for: \"PH\", \"LACT\", \"A1C\", \"PEPPER\", \"PHOS\", \"MAG\", \"LIPASE\", \"AMYLASE\", \"TSH\", \"T4\", \"T3\", \"CRP\", \"SED\"    Anesthesia Plan    ASA Status:  3       Anesthesia Type: General.     - Airway: ETT   Induction: Intravenous, Propofol.   Maintenance: Balanced.        Consents    Anesthesia Plan(s) and associated risks, benefits, and realistic alternatives discussed. Questions answered and patient/representative(s) expressed understanding.     - Discussed:     - Discussed with:  Patient            Postoperative Care    Pain management: IV analgesics, Oral pain medications, Multi-modal analgesia.   PONV prophylaxis: Ondansetron (or other 5HT-3), Dexamethasone or Solumedrol     Comments:                Ant Duran MD  "

## 2023-11-01 NOTE — ANESTHESIA CARE TRANSFER NOTE
Patient: Carson Zurita    Procedure: Procedure(s):  Left reverse total shoulder arthroplasty, bicep tenodesis       Diagnosis: Rotator cuff arthropathy [M12.819]  Diagnosis Additional Information: No value filed.    Anesthesia Type:   General     Note:    Oropharynx: spontaneously breathing and oropharynx clear of all foreign objects  Level of Consciousness: awake and drowsy  Oxygen Supplementation: face mask  Level of Supplemental Oxygen (L/min / FiO2): 6  Independent Airway: airway patency satisfactory and stable  Dentition: dentition unchanged  Vital Signs Stable: post-procedure vital signs reviewed and stable  Report to RN Given: handoff report given  Patient transferred to: Phase II    Handoff Report: Identifed the Patient, Identified the Reponsible Provider, Reviewed the pertinent medical history, Discussed the surgical course, Reviewed Intra-OP anesthesia mangement and issues during anesthesia, Set expectations for post-procedure period and Allowed opportunity for questions and acknowledgement of understanding      Vitals:  Vitals Value Taken Time   /79 11/01/23 1036   Temp     Pulse 87 11/01/23 1038   Resp 11 11/01/23 1038   SpO2 99 % 11/01/23 1038   Vitals shown include unfiled device data.    Electronically Signed By: MORA Cherry CRNA  November 1, 2023  10:39 AM

## 2023-11-01 NOTE — ANESTHESIA POSTPROCEDURE EVALUATION
Patient: Carson Zurita    Procedure: Procedure(s):  Left reverse total shoulder arthroplasty, bicep tenodesis       Anesthesia Type:  General    Note:     Postop Pain Control: Uneventful            Sign Out: Well controlled pain   PONV: No   Neuro/Psych: Uneventful            Sign Out: Acceptable/Baseline neuro status   Airway/Respiratory: Uneventful            Sign Out: Acceptable/Baseline resp. status   CV/Hemodynamics: Uneventful            Sign Out: Acceptable CV status   Other NRE: NONE   DID A NON-ROUTINE EVENT OCCUR? No           Last vitals:  Vitals Value Taken Time   /81 11/01/23 1145   Temp 97.5  F (36.4  C) 11/01/23 1035   Pulse 77 11/01/23 1151   Resp 10 11/01/23 1151   SpO2 92 % 11/01/23 1151   Vitals shown include unfiled device data.    Electronically Signed By: Ant Duran MD  November 1, 2023  11:52 AM

## 2023-11-01 NOTE — OP NOTE
ORTHOPEDIC SURGERY  OPERATIVE NOTE    Carson Zurita  [id number]  1950    November 1, 2023      PREOPERATIVE DIAGNOSIS:    Left rotator cuff arthropathy  Left shoulder instability with multiple dislocations  A fib on Xarelto  COPD on Spiriva  Hx of Smoking     POSTOPERATIVE DIAGNOSIS:   Same    PROCEDURE:    Left Reverse shoulder arthroplasty with computer navigation  Left Long head of biceps tenodesis    SURGEON:  Soy Sanchez MD    FIRST ASSISTANT: Christal Vargas PA-C; A skilled first assistant was necessary for this procedure for assistance with patient positioning, prepping, draping, surgical visualization, wound closure, and application of the dressing.     SPECIMENS:  Hypertropic synovium    COMPLICATIONS:  None    ESTIMATED BLOOD LOSS: 100cc    IMPLANTS:   Implant Name Type Inv. Item Serial No.  Lot No. LRB No. Used Action   PLATE GLND EQUINOXE SHLDR LT  RVRS SUP -35-07 - [id number] Total Joint Component/Insert PLATE GLND EQUINOXE SHLDR LT  RVRS SUP -35-07 [id number] [employer]  Left 1 Implanted   IMP SCR EQUINOXE REV SHLDR CMP LK CAP KIT 4.5X42MM 320-20-42 - [id number] Metallic Hardware/[place] IMP SCR EQUINOXE REV SHLDR CMP LK CAP KIT 4.5X42MM 320-20-42 [id number] [employer]  Left 1 Implanted   IMP SCR EQUINOXE REV SHLDR CMP LK CAP KIT 4.5X26MM 320-20-26 - [id number] Metallic Hardware/[place] IMP SCR EQUINOXE REV SHLDR CMP LK CAP KIT 4.5X26MM 320-20-26 [id number] [employer]  Left 1 Implanted   IMP SCR EQUINOXE REV SHLDR CMP LK CAP KIT 4.5X30MM 320-20-30 - [id number] Metallic Hardware/[place] IMP SCR EQUINOXE REV SHLDR CMP LK CAP KIT 4.5X30MM 320-20-30 [id number] [employer]  Left 1 Implanted   IMP SCR EQUINOXE REV SHLDR CMP LK CAP KIT 4.5X34MM 320-20-34 - [id number] Metallic Hardware/[place] IMP SCR EQUINOXE REV SHLDR CMP LK CAP KIT 4.5X34MM 320-20-34 [id number] [employer]  Left 1 Implanted   IMP SCR EQUINOXE GLENOSPHERE REV LOCKING 320-15-05 - [id number] Metallic Hardware/[place] IMP SCR EQUINOXE [facility]  REV LOCKING 320-15-05 [id number] EXACTEC  Left 1 Implanted   SPHERE GLND 28.1MM 40MM EQUINOXE SHLDR [facility]PD [facility] [id number] Total Joint Component/Insert SPHERE GLND 28.1MM 40MM EQUINOXE SHLDR XPD [facility] [id number] EXACTEC  Left 1 Implanted   IMP TRAY HUMERAL REV SHLDR ADAPTER PLATE +0 320-10-00 - [id number] Total Joint Component/Insert IMP TRAY HUMERAL REV SHLDR ADAPTER PLATE +0 320-10-00 [id number] [employer]  Left 1 Implanted   LINER HUM 40MM EQUINOXE +0MM SHLDR [facility] 320-40-00 - [id number] Total Joint Component/Insert LINER HUM 40MM EQUINOXE +0MM SHLDR [facility] 320-40-00 [id number] [employer]ACT[employer]  Left 1 Implanted   STEM HUM 70MM 8MM EQUINOXE STRL - [id number] Total Joint Component/Insert STEM HUM 70MM 8MM EQUINOXE STRL [id number] [employer]  Left 1 Implanted   Equinoxe Reverse Shoulder Fixed Angle Torque Defining Screw Kit   [id number] [employer]ACTEC  Left 1 Implanted   IMP ANCHOR ARTHREX BIO-SWIVELOCK 4.56J52FM [id number] - [id number] Metallic Hardware/[place] IMP ANCHOR ARTHREX BIO-SWIVELOCK 4.57J86MY [id number]  ARTHREX [id number] Left 1 Implanted       INDICATIONS:    Carson Zurita is a 72 year old male with a history of atrial fibrillation on Xarelto, COPD on Spiriva, smoking who presented with a left shoulder injury.  Patient had a history of a shoulder arthroscopy in 2013 at [place].  He did well following the surgery but has had increased shoulder pain.  In May 2023 he dislocated his shoulder while sleeping with his arm overhead.  He subsequently had 2 more dislocations in June and September 2023.  All 3 dislocations required reductions in the emergency department.  His most recent dislocation required approximately 3 attempts to reduce it.  His motion has been limited due to concern for ongoing dislocations and he has been using a sling.  On exam he had minimal forward elevation to approximately 90 degrees, external rotation of 45 degrees he had notable weakness in in all directions of the shoulder.  A MRI was obtained on June 6 and revealed  severe glenohumeral joint arthritis and advanced rotator cuff tendinopathy with full-thickness tears of the supra, infra, and subscapularis.  There is significant hypertrophy of the biceps.  There was a small Hill-Sachs lesion and a soft tissue Bankart lesion.    I discussed these findings with the patient along with potential treatment options including nonoperative and operative intervention along with the risks and benefits and expected recovery.  I recommended surgery due to his advanced rotator cuff arthropathy as well as instability.  We discussed this would provide him with likely improved function, stability and decrease arthritic pain.  We discussed risk related to surgery specifically related to wound healing and smoking as well as infection.    The risks, benefits and alternatives to the proposed procedure were discussed in detail.  The risks of bleeding, infection, nerve damage, hardware failure, dislocation, loss of motion, stiffness, need for further surgery, and the medical risks of anesthesia were discussed. Benefits including improved chance of motion, earlier rehab and improved function were discussed.       Alternatives including nonoperative management were discussed, but not recommended.  The patient was in agreement with the plan to proceed.  The informed consent was signed and documented.  Met with the patient preoperatively to shira the operative extremity.    OPERATIVE COURSE:    The patient was brought into the operating room and placed on operating table.  The patient underwent general anesthesia.  The patient was positioned in a low beachchair position.  All bony prominences were well-padded.  The operative extremity was cleansed with Hibiclens, peroxide, and then alcohol.  The operative extremity was then prepped with ChloraPrep.  The surgical team scrubbed in.  A WHO timeout was conducted to verify correct patient, correct extremity, presence of the surgeon's initials on the operative  extremity, and administration of IV antibiotics, in this case Ancef.       A deltopectoral approach to the proximal humerus was performed.   The skin was incised with a knife and subcutaneous tissue with electrocautery. We dissected down to the deltopectoral interval and entered this interval.  The cephalic vein was preserved. The conjoined tendon was identified and the clavipectoral fascia was incised.  The conjoined tendon was retracted.  The coracoid was identified.  The ExacTech mount was fixed to the clavicle with 2 pins.     The deltoid was elevated off of the proximal humerus just distal to the shaft preserving its insertion.  The pectoralis insertion was released a couple millimeters.       The biceps tendon was identified. There was significant inflammation and synovial hypertrophy around the biceps tendon. The biceps was sutured to the pectoralis major tendon to complete a tenodesis.  The proximal aspect of the biceps tendon was then excised. The rotator cuff interval was then further incised up to the superior glenoid.      The subscapularis tendon was then elevated off the lesser tuberosity and tagged with Ethibond sutures. The rotator cuff interval was opened. The capsule was released off the humerus. The rotator cuff was noted to be torn with full thickness tears of the supraspinatus and infraspinatus.      The humerus head was then delivered with external rotation and elevation. The capsule was then further released. The humeral head cutting guide was utilized. The humeral version was marked at 20deg with cautery.      Glenoid preparation: A 360 degree release of the subscap and glenoid were performed with careful protection of the axillary and musculocutaneous nerves. The glenohumeral ligament was then incised just posterior to the subscapularis to allow for visualization of the glenoid.  A Fukuda retractor was placed along the posterior glenoid and gently retracted the humeral shaft.  A Hohmann was  placed superiorly and a Batman retractor was placed anteriorly.  There was significant hypertrophy and mild pigmentation of the synovium.  A small sample was sent for further analysis, r/o PVNS.   No concern for infection.  A significant amount of time was spent to remove the hypertrophic synovium.  The labrum was then removed with electrocautery.  Care was taken along the inferior glenoid to preserve the axillary nerve.  It was palpated and found to be intact.  A Roca was used to remove cartilage from the glenoid.      The ExacTech probe was then utilized to calibrate computer navigation to the patient's anatomy.  A good fit was found with a <0.7mm variation on the vast majority of marks.  The center pin was then placed in the glenoid under computer navigation utilizing the pretemplated plan.  The pin was then overreamed under computer navigation followed by the planer.  The glenoid was inspected for any osteophytes which were removed.     A small glenoid baseplate was then placed. The cage was filled with cancellous bone from the humeral head cut. Computer navigation was utilized to confirm good rotation and trajectory of the baseplate. 4 additional glenoid screws were then placed in the inferior, anterior, posterior, and superior poles of the baseplate.  Locking caps were placed over the screws as well.       A standard + 4 (expanded) 40 mm glenosphere was then placed on the baseplate and screwed into place.  The glenosphere and baseplate were tested and found to be stable.      Humerus preparation:   We then turned our attention to the humeral shaft.  The humeral shaft was reamed broached by hand up to a size 8.  This was found to be stable with no rotation of the stem within the humerus.  We then proceeded with a trial consisting of a +0 humeral tray and a +0 poly. After a series of trials a +0 tray and a +0 poly produced a stable shoulder with great range of motion in all directions. This was found to be close  to a good fit.  A size 8 stem was then opened and placed.  We then trialed again off the final stem with a +0 humeral tray and a +0poly.  The decision was made to proceed with this configuration..  The final tray and polyethylene were then placed and again the shoulder was ranged and found to have good ability throughout with minimal shuck.     The wound was then copiously irrigated.    We then proceeded with repair of the subscapularis which consisted of Ethibond which was passed through the subscapularis and anchored in a 4.75 mm swivel lock at the lesser tuberosity.  An additional 2 sutures were passed from the swivel lock to further reinforce the repair.     Dilute Betadine was utilized for 3 minutes in the wound.  Followed by irrigation with a Pulsavac.  1gram of Vancomycin powder was placed deep in the wound. The deltopectoral interval was then approximated with 0 Vicryl.  A 2-0 Monocryl was used along the subcutaneous tissue.  A zip tie closure was then used on the skin followed by Dermabond.  An Aquacel dressing was placed over the wound.  The patient was placed in a sling.     The patient awoke from anesthesia and was transferred to the PACU in stable condition.  All instruments were accounted for at the end of the case     All instruments were accounted for at the end of the case.        PLAN:  Postoperatively:   1.  Ancef x 24 hours   2.  DVT prophylaxis: May resume Xarelto  3.   PACU x-rays.   4.  NWB RUE, Sling at night and OOB. Begin pendulums. NO ER pass 30 deg, No behind the back motion. No flexion pass 130 deg.  5.  Physical therapy/occupational therapy. Outpatient PT set up at [facility]  6.  Tegarderm dressing on for 2 weeks.  OK to shower.  No submerging wound.     FOLLOWUP:     1.  Follow up appointment is already scheduled     Dr. Sanchez's care coordinator is Destiney Wells. Please contact her at [phone] to schedule an appointment.       Dr. Sanchez sees patient's at 2 clinic  locations:  [facility] [place]  [address] [place] [id number]  [facility] [place]   [address] , [address], [place] [address]           CHRISTOPHER HERRERA MD   [facility]

## 2023-11-01 NOTE — ANESTHESIA PROCEDURE NOTES
Airway       Patient location during procedure: OR       Procedure Start/Stop Times: 11/1/2023 7:41 AM  Staff -        Anesthesiologist:  Ant Duran MD       CRNA: Molly Tyler APRN CRNA       Performed By: CRNA  Consent for Airway        Urgency: elective  Indications and Patient Condition       Indications for airway management: toni-procedural       Induction type:intravenous       Mask difficulty assessment: 2 - vent by mask + OA or adjuvant +/- NMBA    Final Airway Details       Final airway type: endotracheal airway       Successful airway: ETT - single  Endotracheal Airway Details        ETT size (mm): 8.0       Cuffed: yes       Cuff volume (mL): 7       Successful intubation technique: direct laryngoscopy       DL Blade Type: MAC 4       Grade View of Cords: 1       Adjucts: stylet       Position: Right       Measured from: gums/teeth       Secured at (cm): 26       Bite block used: None    Post intubation assessment        Placement verified by: capnometry and equal breath sounds        Number of attempts at approach: 1       Number of other approaches attempted: 0       Secured with: plastic tape       Ease of procedure: easy       Dentition: Intact and Unchanged    Medication(s) Administered   Medication Administration Time: 11/1/2023 7:41 AM

## 2023-11-02 ENCOUNTER — APPOINTMENT (OUTPATIENT)
Dept: OCCUPATIONAL THERAPY | Facility: CLINIC | Age: 73
End: 2023-11-02
Payer: MEDICARE

## 2023-11-02 VITALS
WEIGHT: 221.7 LBS | DIASTOLIC BLOOD PRESSURE: 87 MMHG | TEMPERATURE: 97.4 F | SYSTOLIC BLOOD PRESSURE: 154 MMHG | BODY MASS INDEX: 31.04 KG/M2 | RESPIRATION RATE: 18 BRPM | HEART RATE: 85 BPM | HEIGHT: 71 IN | OXYGEN SATURATION: 94 %

## 2023-11-02 LAB
GLUCOSE BLDC GLUCOMTR-MCNC: 138 MG/DL (ref 70–99)
HGB BLD-MCNC: 12.3 G/DL (ref 13.3–17.7)
PATH REPORT.COMMENTS IMP SPEC: NORMAL
PATH REPORT.COMMENTS IMP SPEC: NORMAL
PATH REPORT.FINAL DX SPEC: NORMAL
PATH REPORT.GROSS SPEC: NORMAL
PATH REPORT.INTRAOP OBS SPEC DOC: NORMAL
PATH REPORT.MICROSCOPIC SPEC OTHER STN: NORMAL
PATH REPORT.RELEVANT HX SPEC: NORMAL
PHOTO IMAGE: NORMAL

## 2023-11-02 PROCEDURE — 97110 THERAPEUTIC EXERCISES: CPT | Mod: GO

## 2023-11-02 PROCEDURE — 250N000011 HC RX IP 250 OP 636: Mod: JZ | Performed by: STUDENT IN AN ORGANIZED HEALTH CARE EDUCATION/TRAINING PROGRAM

## 2023-11-02 PROCEDURE — 36415 COLL VENOUS BLD VENIPUNCTURE: CPT

## 2023-11-02 PROCEDURE — 85018 HEMOGLOBIN: CPT

## 2023-11-02 PROCEDURE — 88305 TISSUE EXAM BY PATHOLOGIST: CPT | Mod: 26 | Performed by: PATHOLOGY

## 2023-11-02 PROCEDURE — 250N000013 HC RX MED GY IP 250 OP 250 PS 637

## 2023-11-02 PROCEDURE — 97535 SELF CARE MNGMENT TRAINING: CPT | Mod: GO

## 2023-11-02 PROCEDURE — 258N000003 HC RX IP 258 OP 636

## 2023-11-02 PROCEDURE — 88331 PATH CONSLTJ SURG 1 BLK 1SPC: CPT | Mod: 26 | Performed by: PATHOLOGY

## 2023-11-02 PROCEDURE — 99214 OFFICE O/P EST MOD 30 MIN: CPT | Performed by: HOSPITALIST

## 2023-11-02 PROCEDURE — 82962 GLUCOSE BLOOD TEST: CPT

## 2023-11-02 PROCEDURE — 97165 OT EVAL LOW COMPLEX 30 MIN: CPT | Mod: GO

## 2023-11-02 PROCEDURE — 250N000013 HC RX MED GY IP 250 OP 250 PS 637: Performed by: PHYSICIAN ASSISTANT

## 2023-11-02 RX ADMIN — SODIUM CHLORIDE, POTASSIUM CHLORIDE, SODIUM LACTATE AND CALCIUM CHLORIDE: 600; 310; 30; 20 INJECTION, SOLUTION INTRAVENOUS at 01:02

## 2023-11-02 RX ADMIN — BENZOCAINE 6 MG-MENTHOL 10 MG LOZENGES 1 LOZENGE: at 08:01

## 2023-11-02 RX ADMIN — FERROUS SULFATE TAB 325 MG (65 MG ELEMENTAL FE) 325 MG: 325 (65 FE) TAB at 07:57

## 2023-11-02 RX ADMIN — ACETAMINOPHEN 975 MG: 325 TABLET, FILM COATED ORAL at 04:00

## 2023-11-02 RX ADMIN — GABAPENTIN 1200 MG: 600 TABLET, FILM COATED ORAL at 07:57

## 2023-11-02 RX ADMIN — METOPROLOL TARTRATE 25 MG: 25 TABLET, FILM COATED ORAL at 07:57

## 2023-11-02 RX ADMIN — POLYETHYLENE GLYCOL 3350 17 G: 17 POWDER, FOR SOLUTION ORAL at 07:56

## 2023-11-02 RX ADMIN — LISINOPRIL 10 MG: 10 TABLET ORAL at 07:57

## 2023-11-02 RX ADMIN — OXYCODONE HYDROCHLORIDE 5 MG: 5 TABLET ORAL at 09:48

## 2023-11-02 RX ADMIN — SENNOSIDES AND DOCUSATE SODIUM 1 TABLET: 8.6; 5 TABLET ORAL at 07:57

## 2023-11-02 RX ADMIN — CEFAZOLIN SODIUM 2 G: 2 INJECTION, SOLUTION INTRAVENOUS at 01:02

## 2023-11-02 RX ADMIN — METHOCARBAMOL 500 MG: 500 TABLET ORAL at 07:57

## 2023-11-02 ASSESSMENT — ACTIVITIES OF DAILY LIVING (ADL)
ADLS_ACUITY_SCORE: 22
PREVIOUS_RESPONSIBILITIES: MEAL PREP;HOUSEKEEPING;LAUNDRY;SHOPPING;MEDICATION MANAGEMENT;FINANCES;DRIVING
ADLS_ACUITY_SCORE: 21

## 2023-11-02 NOTE — PLAN OF CARE
"Care continued 2300 - 0730  Goal Outcome Evaluation:    Pt endorses some discomfort but is tolerating it well   Ice applied, extremity repositioned with good results   Continue IV abx as indicated   CMS intact   Output >850 this shift    Some new swelling noted in right lower arm and hand  FYI sent to MD and ace bandage around extremity loosened, left on extremity per MD    VSS   BP (!) 143/69 (BP Location: Right arm)   Pulse 84   Temp 97.7  F (36.5  C) (Temporal)   Resp 16   Ht 1.803 m (5' 10.98\")   Wt 100.6 kg (221 lb 11.2 oz)   SpO2 93%   BMI 30.93 kg/m      Cluster cares in effect to promote sleep and reduce risks for delirium. RN will continue to maintain care and the treatment plan this shift, handing off care to oncoming RN at 07:30 11/02/2023     Meagan Garcia RN 11/02/2023 6:56 AM         "

## 2023-11-02 NOTE — PROGRESS NOTES
[facility]    Hospitalist Progress Note    Date of Service (when I saw the patient): 11/02/2023  Provider:  Abdirashid Vidal MD   Text Page  7am - 6PM       Assessment & Plan   Carson Zurita is a 72 year old male with a history of atrial fibrillation, hypertension, COPD, prior tobacco use, HLD, who was admitted for left shoulder replacement due to repeated dislocation of shoulder and rotator cuff use.     Left rotator cuff arthropathy with shoulder instability and dislocations status post left reverse total shoulder arthroplasty, bicep tenodesis, on 11/1.     Hospital course has been uneventful, very stable, good pain control, no incidents overnight.  Orthopedic surgeon has recommended discharge to home today.  T    Atrial fibrillation per patient PAF, rate controlled.  Resume prior to admission metoprolol.  Continue Xarelto      Hypertension -continue lisinopril 11/2 with hold parameters.     COPD  History of tobacco use -chronic cough.  No respiratory distress or expiratory wheezing. Restart Spiriva as prior to admission e. Albuterol nebs ordered as needed.     APOLONIA -continue CPAP nightly.     Hyperlipidemia -resume statin at discharge.       DVT Prophylaxis: DOAC  Code Status: Full Code    Disposition: Expected discharge today per surgeon plan    Interval History   Good pain control, no new symptoms.  He feels in good condition to discharge.  He has been instructed to resume his medications prior to admission, follow-up with his primary care physician.    -Data reviewed today: I reviewed all new labs and imaging results over the last 24 hours.     Physical Exam   Temp: 97.4  F (36.3  C) Temp src: Temporal BP: (!) 154/87 Pulse: 85   Resp: 18 SpO2: 94 % O2 Device: None (Room air) Oxygen Delivery: 2 LPM  Vitals:    09/26/23 1300 11/01/23 0531   Weight: 99.1 kg (218 lb 6.4 oz) 100.6 kg (221 lb 11.2 oz)     Vital Signs with Ranges  Temp:  [96.8  F (36  C)-97.7  F (36.5  C)] 97.4  F (36.3   C)  Pulse:  [66-85] 85  Resp:  [9-20] 18  BP: (124-154)/(55-87) 154/87  SpO2:  [89 %-98 %] 94 %  I/O last 3 completed shifts:  In: 2070 [P.O.:1220; I.V.:850]  Out: 4210 [Urine:4210]    GEN:  Alert, oriented x 3, appears comfortable, NAD.  HEENT:  Normocephalic/atraumatic, no scleral icterus, no nasal discharge, mouth moist.  CV:  Regular rate and rhythm, no murmur or JVD.  S1 + S2 noted, no S3 or S4.  LUNGS:  Clear to auscultation bilaterally without rales/rhonchi/wheezing/retractions.  Symmetric chest rise on inhalation noted.  ABD:  Active bowel sounds, soft, non-tender/non-distended.  No rebound/guarding/rigidity.  EXT:  No edema or cyanosis.  No joint synovitis noted.  SKIN:  Dry to touch, no exanthems noted in the visualized areas.       Medications    lactated ringers 75 mL/hr at 11/02/23 0102      acetaminophen  975 mg Oral Q8H    DULoxetine  60 mg Oral QPM    ferrous sulfate  325 mg Oral Daily with breakfast    gabapentin  1,200 mg Oral TID    ipratropium - albuterol 0.5 mg/2.5 mg/3 mL  3 mL Nebulization At Bedtime    lisinopril  10 mg Oral Daily    methocarbamol  500 mg Oral 4x Daily    metoprolol tartrate  25 mg Oral BID    nicotine   Transdermal Q8H    polyethylene glycol  17 g Oral Daily    rivaroxaban ANTICOAGULANT  10 mg Oral Daily with supper    senna-docusate  1 tablet Oral BID    sodium chloride (PF)  3 mL Intracatheter Q8H       Data   Recent Labs   Lab 11/02/23  0722 11/02/23  0635 11/01/23  0529   HGB 12.3*  --   --    GLC  --  138* 100*       Recent Results (from the past 24 hour(s))   XR Shoulder Left Port G/E 2 Views    Narrative    XR SHOULDER LEFT PORT G/E 2 VIEWS 11/1/2023 11:20 AM     HISTORY: Status post surgery    COMPARISON: None.      Impression    IMPRESSION: Reverse total shoulder arthroplasty. Postoperative air is  present. Components are well seated. No fractures are noted.     BRIAN GREENWOOD MD         SYSTEM ID:  [id number]         Securely message with the Vocera Web Console  (learn more here)  Text page via [facility] Paging/Directory        Disclaimer: This note consists of symbols derived from keyboarding, dictation and/or voice recognition software. As a result, there may be errors in the script that have gone undetected. Please consider this when interpreting information found in this chart.

## 2023-11-02 NOTE — PLAN OF CARE
Occupational Therapy Discharge Summary    Reason for therapy discharge:    Discharged to home with outpatient therapy.    Progress towards therapy goal(s). See goals on Care Plan in [facility] electronic health record for goal details.  Goals partially met.  Barriers to achieving goals:   discharge from facility.    Therapy recommendation(s):    Continued therapy is recommended.  Rationale/Recommendations:  OP PT for progression of L UE function.

## 2023-11-02 NOTE — PROGRESS NOTES
Orthopedics Daily Progress Note  Carson Zurita  November 2, 2023  Date of Admission: 11/1/2023      Post Op Day: 1 Day Post-Op   Procedures: Procedure(s):  Left reverse total shoulder arthroplasty, bicep tenodesis      Interval History:  Doing well this morning. Pain controlled. Slept well majority of yesterday.   Discussed activity restrictions. Denies cp or sob. Denies N/V. No concerns.     Temp:  [96.8  F (36  C)-97.7  F (36.5  C)] 97.4  F (36.3  C)  Pulse:  [66-85] 85  Resp:  [9-20] 18  BP: (124-154)/(55-87) 154/87  SpO2:  [89 %-98 %] 94 %    Physical Exam:  Alert, appropriate, and following commands  Breathing easily without wheeze  Dressing/wound c/d/I  Ultrasling in place and fitting well.   FPL, EPL, Intrinsic hand muscles are intact  SILT along radial, ulnar and median nerves. Decreased sensation to 4th&5th fingers.   Palpable radial pulse      Labs/Imaging:  Results for orders placed or performed during the hospital encounter of 11/01/23 (from the past 24 hour(s))   Surgical Pathology Exam   Result Value Ref Range    Case Report       Surgical Pathology Report                         Case: [id number]                                  Authorizing Provider:  Soy Sanchez MD    Collected:           11/01/2023 08:40 AM          Ordering Location:     [facility]   Received:            11/01/2023 09:08 AM                                 Main OR                                                                      Pathologist:           Ilya Martinez MD                                                            Intraop:               Ilya Martinez MD                                                            Specimen:    Synovium, Left shoulder Synovium                                                           Synoptic Checklist      Intraoperative Consultation       A(1). Synovium, Left shoulder Synovium:  AFS1:  Rare neutrophils, <5/hpf.    Ilya Martinez MD on 11/1/2023 at 9:27 AM            XR Shoulder Left Port G/E 2 Views    Narrative    XR SHOULDER LEFT PORT G/E 2 VIEWS 11/1/2023 11:20 AM     HISTORY: Status post surgery    COMPARISON: None.      Impression    IMPRESSION: Reverse total shoulder arthroplasty. Postoperative air is  present. Components are well seated. No fractures are noted.     BRIAN GREENWOOD MD         SYSTEM ID:  [id number]   Glucose by meter   Result Value Ref Range    GLUCOSE BY METER POCT 138 (H) 70 - 99 mg/dL   Hemoglobin   Result Value Ref Range    Hemoglobin 12.3 (L) 13.3 - 17.7 g/dL         A/P - 72 year old male s/p left reverse total shoulder arthroplasty    Continue ACE bandage to right elbow for bursitis for compression/ protection. May remove prn.   Will see PT prior to discharge.   Patient was given surgeon's PT protocol and discharge information.     DVT proph: Restart PTA xeralto POD1.   Abx: Ancef x 24 hr post op.  Activity: NWB RUE, Sling at night and OOB. Begin pendulums. NO ER pass 30 deg, No behind the back motion. No flexion pass 130 deg PT/OT.  Dressing: Keep intact. Change if > 60% saturated. Fine to shower POD3.     Follow Up: Mary HESTER/ Dr. HERBIE Sanchez in 2 weeks. Appt scheduled.     Dispo: Discharging home today.      Christal Vargas PA-C  [facility]

## 2023-11-02 NOTE — PROGRESS NOTES
CPAP orders placed. Patient has brought home unit. RT set up, added water, and ensured CPAP running properly.     Neb also ordered for this evening and, per PA note, to be used in place of spiriva as patient is unable to supply. Patient stated at bedside he uses his spirva every morning and would like to continue that and showed RT his home spiriva. Patient would like to self admister and would not like the neb this evening.     RT to follow only as needed at this time.     Frank Holland, RT on 11/1/2023 at 7:51 PM

## 2023-11-02 NOTE — PROGRESS NOTES
11/02/23 0901   Appointment Info   Signing Clinician's Name / Credentials (OT) Carlos Manuel Elizalde OTR/L   Living Environment   People in Home alone   Current Living Arrangements house   Home Accessibility stairs to enter home   Number of Stairs, Main Entrance 3   Stair Railings, Main Entrance railings safe and in good condition   Living Environment Comments Pt lives alone in house with 3 MEIR and all needs met on main level. Supportive family lives nearby and will be able to assist as needed.   Self-Care   Usual Activity Tolerance good   Current Activity Tolerance moderate   Equipment Currently Used at Home none   Fall history within last six months yes   Number of times patient has fallen within last six months 1   Instrumental Activities of Daily Living (IADL)   Previous Responsibilities meal prep;housekeeping;laundry;shopping;medication management;finances;driving   General Information   Onset of Illness/Injury or Date of Surgery 11/01/23   Referring Physician Christal Vargas PA-C   Patient/Family Therapy Goal Statement (OT) To return home   Additional Occupational Profile Info/Pertinent History of Current Problem 71 YO male who is POD#1 from Left reverse total shoulder arthroplasty, bicep tenodesis   Existing Precautions/Restrictions weight bearing;other (see comments)  (NWB LUE, Sling at night and OOB. Begin pendulums. NO ER pass 30 deg, No behind the back motion. No flexion pass 130 deg)   Left Lower Extremity (Weight-bearing Status) non weight-bearing (NWB)   Cognitive Status Examination   Orientation Status orientation to person, place and time   Visual Perception   Visual Impairment/Limitations WFL;corrective lenses for reading   Sensory   Sensory Comments Small finger with numbness.   Pain Assessment   Patient Currently in Pain Yes, see Vital Sign flowsheet   Range of Motion Comprehensive   Comment, General Range of Motion Impairements 2/2 surgical procedure, pain and precautions   Strength Comprehensive  (MMT)   Comment, General Manual Muscle Testing (MMT) Assessment Impairements 2/2 surgical procedure, pain and precautions   Coordination   Upper Extremity Coordination Left UE impaired   Transfers   Transfers sit-stand transfer;toilet transfer   Sit-Stand Transfer   Sit-Stand [place] (Transfers) supervision   Toilet Transfer   [place] Level (Toilet Transfer) supervision   Balance   Balance Comments SBA with no AD   Activities of Daily Living   BADL Assessment/Intervention upper body dressing;lower body dressing   Upper Body Dressing Assessment/Training   [place] Level (Upper Body Dressing) moderate assist (50% patient effort)   Lower Body Dressing Assessment/Training   [place] Level (Lower Body Dressing) minimum assist (75% patient effort)   Clinical Impression   Criteria for Skilled Therapeutic Interventions Met (OT) Yes, treatment indicated   OT Diagnosis Decreased ADL/IADL independence   OT Problem List-Impairments impacting ADL problems related to;post-surgical precautions;pain;strength;range of motion (ROM);sensation;mobility;activity tolerance impaired   Assessment of Occupational Performance 3-5 Performance Deficits   Identified Performance Deficits Dressing, driving, functional mobility, home mgmt, shopping, and other IADL   Planned Therapy Interventions (OT) ADL retraining;IADL retraining;ROM;transfer training;orthotic fitting/training;home program guidelines;progressive activity/exercise;risk factor education   Clinical Decision Making Complexity (OT) problem focused assessment/low complexity   Risk & Benefits of therapy have been explained evaluation/treatment results reviewed;care plan/treatment goals reviewed;risks/benefits reviewed;current/potential barriers reviewed;participants voiced agreement with care plan;participants included;patient;daughter   OT Total Evaluation Time   OT Eval, Low Complexity Minutes (58338) 10   OT Goals   Therapy Frequency (OT) Daily   OT Predicted  Duration/Target Date for Goal Attainment 11/02/23   OT Goals Hygiene/Grooming;Upper Body Dressing;Lower Body Dressing;Toilet Transfer/Toileting;OT Goal 1   OT: Hygiene/Grooming supervision/stand-by assist;while standing;Goal Met   OT: Upper Body Dressing Minimal assist   OT: Lower Body Dressing Supervision/stand-by assist   OT: Lower Body Bathing Supervision/stand-by assist;using adaptive equipment   OT: Toilet Transfer/Toileting Supervision/stand-by assist;toilet transfer;cleaning and garment management;using adaptive equipment   OT: Goal 1 Patient will demonstrate 5/5 L UE elbow, shoulder, and hand exercises with accurate form and w/in precautions. Goal met   Interventions   Interventions Quick Adds Therapeutic Procedures/Exercise   Self-Care/Home Management   Self-Care/Home Mgmt/ADL, Compensatory, Meal Prep Minutes (22115) 20   Treatment Detail/Skilled Intervention Provided education and training on ADL and IADL w/in precautions s/p shoulder surgery. Training on don/doff of sling and pt completes w/ moderate assist. Edu UB dressing techniques and pt donning zip up with moderate verbal cueing and moderate assist. Educated pt and daughter on need of increased assist during acute recover with bathing, dressing, driving, and all heavier IADL.   Therapeutic Procedures/Exercise   Therapeutic Procedure: strength, endurance, ROM, flexibillity minutes (67366) 10   Symptoms Noted During/After Treatment fatigue;increased pain   Treatment Detail/Skilled Intervention Provided education and training on L UE ROM exercises. Completes ~10 reps of elbow flex/ext, pendulums/codmans, supination/pronation, and hand/wrist exercises. Edu on pain free ROM. Pt benefits from moderate cueing to facilitate appropraite form and ROM with each exercise. Provided handout and recommendation for daily completion of exercises.   OT Discharge Planning   OT Plan UB dressing, L UE exs   OT Discharge Recommendation (DC Rec)   (Defer to ortho)   OT  Rationale for DC Rec Patient is presenting below baseline and limited primarily by L UE pain and precautions. Patient is currenlty SBA for functional mobility in room and min-mod A for ADL involving UEs. Patient has demonstrated good safety awareness and safet technique with transfers and ADL. Plans to discharge to [facility] and will have assist with ADL/IADL as needed.   OT Brief overview of current status SBA toilet transfer and ambulation in room. min A LB dressing, mod A UB dressing   Total Session Time   Timed Code Treatment Minutes 30   Total Session Time (sum of timed and untimed services) 40

## 2023-11-07 ENCOUNTER — DOCUMENTATION ONLY (OUTPATIENT)
Dept: OTHER | Facility: CLINIC | Age: 73
End: 2023-11-07
Payer: MEDICARE

## 2024-06-04 NOTE — PROVIDER NOTIFICATION
06/04/24 1331   Discharge Planning   Patient/Family Anticipates Transition to home with family   Concerns to be Addressed all concerns addressed in this encounter   Living Arrangements   People in Home alone   Type of Residence Private Residence   Is your private residence a single family home or apartment? Single family home   Number of Stairs, Within Home, Primary two   Stair Railings, Within Home, Primary railings safe and in good condition   Once home, are you able to live on one level? Yes   Bathroom Shower/Tub Walk-in shower   Equipment Currently Used at Home shower chair   Support System   Support Systems Family Members   Do you have someone available to stay with you one or two nights once you are home? Yes   Medical Clearance   Date of Physical   (TBS)   Clinic Name [facility]   It is recommended that you call and check with any specialty providers before surgery to see if you need surgical clearance.  Do you see any specialty providers outside of your primary care provider? No   Blood   Known Bleeding Disorder or Coagulopathy No   Does the patient have any Church/cultural preferences related to blood products? No   Education   Has the patient scheduled or completed pre-op total joint education, either in class or online, in the last 12 months? No  (Does not use computer. Encouraged to read joint book)

## 2024-06-22 ENCOUNTER — ANESTHESIA EVENT (OUTPATIENT)
Dept: SURGERY | Facility: CLINIC | Age: 74
End: 2024-06-22
Payer: MEDICARE

## 2024-06-25 RX ORDER — TIOTROPIUM BROMIDE INHALATION SPRAY 1.56 UG/1
1 SPRAY, METERED RESPIRATORY (INHALATION) DAILY
COMMUNITY

## 2024-06-25 NOTE — PHARMACY-ADMISSION MEDICATION HISTORY
Med rec completed by pre-admitting RN, VIN Griffith.    PTA Med List   Medication Sig Last Dose    acetaminophen (TYLENOL) 325 MG tablet Take 2 tablets (650 mg) by mouth every 4 hours as needed for other (mild pain)     allopurinol (ZYLOPRIM) 300 MG tablet Take 300 mg by mouth daily     cholecalciferol (VITAMIN D3) 25 mcg (1000 units) capsule Take 1 capsule by mouth daily     DULoxetine (CYMBALTA) 60 MG capsule Take 60 mg by mouth daily     ferrous sulfate (FEROSUL) 325 (65 Fe) MG tablet Take 325 mg by mouth daily (with breakfast)     folic acid (FOLVITE) 400 MCG tablet Take 400 mcg by mouth daily     gabapentin (NEURONTIN) 600 MG tablet Take 1,200 mg by mouth 3 times daily     ibuprofen (ADVIL/MOTRIN) 600 MG tablet Take 1 tablet (600 mg) by mouth every 6 hours as needed for mild pain     lisinopril (ZESTRIL) 10 MG tablet Take 10 mg by mouth daily     metoprolol tartrate (LOPRESSOR) 25 MG tablet Take 25 mg by mouth 2 times daily     Multiple Vitamin (ONE-A-DAY ESSENTIAL) TABS Take 1 tablet by mouth daily     Omega-3 1000 MG capsule Take 1 g by mouth 2 times daily     ondansetron (ZOFRAN ODT) 4 MG ODT tab Take 1 tablet (4 mg) by mouth every 8 hours as needed for nausea Dissolve ON the tongue.     oxyCODONE (ROXICODONE) 5 MG tablet Take 1-2 tablets (5-10 mg) by mouth every 4 hours as needed for moderate to severe pain     senna-docusate (SENOKOT-S/PERICOLACE) 8.6-50 MG tablet Take 1-2 tablets by mouth 2 times daily Take while on oral narcotics to prevent or treat constipation.     simvastatin (ZOCOR) 20 MG tablet Take 20 mg by mouth At Bedtime     tiotropium (SPIRIVA RESPIMAT) 1.25 MCG/ACT inhaler Inhale 1 puff into the lungs daily     XARELTO ANTICOAGULANT 20 MG TABS tablet Take 20 mg by mouth daily (with dinner)

## 2024-06-26 ENCOUNTER — APPOINTMENT (OUTPATIENT)
Dept: PHYSICAL THERAPY | Facility: CLINIC | Age: 74
End: 2024-06-26
Attending: STUDENT IN AN ORGANIZED HEALTH CARE EDUCATION/TRAINING PROGRAM
Payer: MEDICARE

## 2024-06-26 ENCOUNTER — APPOINTMENT (OUTPATIENT)
Dept: GENERAL RADIOLOGY | Facility: CLINIC | Age: 74
End: 2024-06-26
Payer: MEDICARE

## 2024-06-26 ENCOUNTER — ANESTHESIA (OUTPATIENT)
Dept: SURGERY | Facility: CLINIC | Age: 74
End: 2024-06-26
Payer: MEDICARE

## 2024-06-26 ENCOUNTER — HOSPITAL ENCOUNTER (OUTPATIENT)
Facility: CLINIC | Age: 74
Discharge: HOME | End: 2024-06-27
Attending: STUDENT IN AN ORGANIZED HEALTH CARE EDUCATION/TRAINING PROGRAM | Admitting: STUDENT IN AN ORGANIZED HEALTH CARE EDUCATION/TRAINING PROGRAM
Payer: MEDICARE

## 2024-06-26 DIAGNOSIS — Z96.641 STATUS POST TOTAL REPLACEMENT OF RIGHT HIP: Primary | ICD-10-CM

## 2024-06-26 PROCEDURE — 250N000025 HC SEVOFLURANE, PER MIN: Performed by: STUDENT IN AN ORGANIZED HEALTH CARE EDUCATION/TRAINING PROGRAM

## 2024-06-26 PROCEDURE — 272N000001 HC OR GENERAL SUPPLY STERILE: Performed by: STUDENT IN AN ORGANIZED HEALTH CARE EDUCATION/TRAINING PROGRAM

## 2024-06-26 PROCEDURE — 258N000003 HC RX IP 258 OP 636

## 2024-06-26 PROCEDURE — 250N000011 HC RX IP 250 OP 636

## 2024-06-26 PROCEDURE — 999N000141 HC STATISTIC PRE-PROCEDURE NURSING ASSESSMENT: Performed by: STUDENT IN AN ORGANIZED HEALTH CARE EDUCATION/TRAINING PROGRAM

## 2024-06-26 PROCEDURE — 250N000011 HC RX IP 250 OP 636: Performed by: NURSE ANESTHETIST, CERTIFIED REGISTERED

## 2024-06-26 PROCEDURE — 258N000001 HC RX 258: Performed by: STUDENT IN AN ORGANIZED HEALTH CARE EDUCATION/TRAINING PROGRAM

## 2024-06-26 PROCEDURE — 250N000009 HC RX 250: Performed by: STUDENT IN AN ORGANIZED HEALTH CARE EDUCATION/TRAINING PROGRAM

## 2024-06-26 PROCEDURE — 250N000011 HC RX IP 250 OP 636: Performed by: STUDENT IN AN ORGANIZED HEALTH CARE EDUCATION/TRAINING PROGRAM

## 2024-06-26 PROCEDURE — 258N000003 HC RX IP 258 OP 636: Performed by: ANESTHESIOLOGY

## 2024-06-26 PROCEDURE — 258N000003 HC RX IP 258 OP 636: Performed by: NURSE ANESTHETIST, CERTIFIED REGISTERED

## 2024-06-26 PROCEDURE — 710N000009 HC RECOVERY PHASE 1, LEVEL 1, PER MIN: Performed by: STUDENT IN AN ORGANIZED HEALTH CARE EDUCATION/TRAINING PROGRAM

## 2024-06-26 PROCEDURE — C1713 ANCHOR/SCREW BN/BN,TIS/BN: HCPCS | Performed by: STUDENT IN AN ORGANIZED HEALTH CARE EDUCATION/TRAINING PROGRAM

## 2024-06-26 PROCEDURE — 97116 GAIT TRAINING THERAPY: CPT | Mod: GP | Performed by: PHYSICAL THERAPIST

## 2024-06-26 PROCEDURE — 999N000065 XR PELVIS AND HIP PORTABLE RIGHT 1 VIEW

## 2024-06-26 PROCEDURE — 97530 THERAPEUTIC ACTIVITIES: CPT | Mod: GP | Performed by: PHYSICAL THERAPIST

## 2024-06-26 PROCEDURE — 250N000011 HC RX IP 250 OP 636: Mod: JZ | Performed by: ANESTHESIOLOGY

## 2024-06-26 PROCEDURE — 250N000009 HC RX 250

## 2024-06-26 PROCEDURE — 97161 PT EVAL LOW COMPLEX 20 MIN: CPT | Mod: GP,KX | Performed by: PHYSICAL THERAPIST

## 2024-06-26 PROCEDURE — 250N000009 HC RX 250: Performed by: NURSE ANESTHETIST, CERTIFIED REGISTERED

## 2024-06-26 PROCEDURE — 250N000011 HC RX IP 250 OP 636: Mod: JZ | Performed by: NURSE ANESTHETIST, CERTIFIED REGISTERED

## 2024-06-26 PROCEDURE — 93010 ELECTROCARDIOGRAM REPORT: CPT | Performed by: INTERNAL MEDICINE

## 2024-06-26 PROCEDURE — C1776 JOINT DEVICE (IMPLANTABLE): HCPCS | Performed by: STUDENT IN AN ORGANIZED HEALTH CARE EDUCATION/TRAINING PROGRAM

## 2024-06-26 PROCEDURE — 370N000017 HC ANESTHESIA TECHNICAL FEE, PER MIN: Performed by: STUDENT IN AN ORGANIZED HEALTH CARE EDUCATION/TRAINING PROGRAM

## 2024-06-26 PROCEDURE — 250N000013 HC RX MED GY IP 250 OP 250 PS 637

## 2024-06-26 PROCEDURE — 360N000077 HC SURGERY LEVEL 4, PER MIN: Performed by: STUDENT IN AN ORGANIZED HEALTH CARE EDUCATION/TRAINING PROGRAM

## 2024-06-26 DEVICE — TRIDENT II TRITANIUM CLUSTER 60G
Type: IMPLANTABLE DEVICE | Site: HIP | Status: FUNCTIONAL
Brand: TRIDENT II

## 2024-06-26 DEVICE — 6.5MM LOW PROFILE HEX SCREW 35MM
Type: IMPLANTABLE DEVICE | Site: HIP | Status: FUNCTIONAL
Brand: TRIDENT II

## 2024-06-26 DEVICE — HIP STEM - HIGH OFFSET
Type: IMPLANTABLE DEVICE | Site: HIP | Status: FUNCTIONAL
Brand: INSIGNIA

## 2024-06-26 DEVICE — TRIDENT X3 0 DEGREE POLYETHYLENE INSERT
Type: IMPLANTABLE DEVICE | Site: HIP | Status: FUNCTIONAL
Brand: TRIDENT X3 INSERT

## 2024-06-26 DEVICE — CERAMIC V40 FEMORAL HEAD
Type: IMPLANTABLE DEVICE | Site: HIP | Status: FUNCTIONAL
Brand: BIOLOX

## 2024-06-26 RX ORDER — BISACODYL 10 MG
10 SUPPOSITORY, RECTAL RECTAL DAILY PRN
Status: DISCONTINUED | OUTPATIENT
Start: 2024-06-29 | End: 2024-06-27 | Stop reason: HOSPADM

## 2024-06-26 RX ORDER — HYDROMORPHONE HCL IN WATER/PF 6 MG/30 ML
0.4 PATIENT CONTROLLED ANALGESIA SYRINGE INTRAVENOUS
Status: DISCONTINUED | OUTPATIENT
Start: 2024-06-26 | End: 2024-06-27 | Stop reason: HOSPADM

## 2024-06-26 RX ORDER — ACETAMINOPHEN 325 MG/1
650 TABLET ORAL EVERY 4 HOURS PRN
Status: DISCONTINUED | OUTPATIENT
Start: 2024-06-29 | End: 2024-06-27 | Stop reason: HOSPADM

## 2024-06-26 RX ORDER — LIDOCAINE HYDROCHLORIDE 20 MG/ML
INJECTION, SOLUTION INFILTRATION; PERINEURAL PRN
Status: DISCONTINUED | OUTPATIENT
Start: 2024-06-26 | End: 2024-06-26

## 2024-06-26 RX ORDER — NALOXONE HYDROCHLORIDE 0.4 MG/ML
0.1 INJECTION, SOLUTION INTRAMUSCULAR; INTRAVENOUS; SUBCUTANEOUS
Status: DISCONTINUED | OUTPATIENT
Start: 2024-06-26 | End: 2024-06-26 | Stop reason: HOSPADM

## 2024-06-26 RX ORDER — SODIUM CHLORIDE, SODIUM LACTATE, POTASSIUM CHLORIDE, CALCIUM CHLORIDE 600; 310; 30; 20 MG/100ML; MG/100ML; MG/100ML; MG/100ML
INJECTION, SOLUTION INTRAVENOUS CONTINUOUS PRN
Status: DISCONTINUED | OUTPATIENT
Start: 2024-06-26 | End: 2024-06-26

## 2024-06-26 RX ORDER — METHOCARBAMOL 500 MG/1
500 TABLET, FILM COATED ORAL EVERY 6 HOURS PRN
Status: DISCONTINUED | OUTPATIENT
Start: 2024-06-26 | End: 2024-06-27 | Stop reason: HOSPADM

## 2024-06-26 RX ORDER — FENTANYL CITRATE 50 UG/ML
INJECTION, SOLUTION INTRAMUSCULAR; INTRAVENOUS PRN
Status: DISCONTINUED | OUTPATIENT
Start: 2024-06-26 | End: 2024-06-26

## 2024-06-26 RX ORDER — PROPOFOL 10 MG/ML
INJECTION, EMULSION INTRAVENOUS PRN
Status: DISCONTINUED | OUTPATIENT
Start: 2024-06-26 | End: 2024-06-26

## 2024-06-26 RX ORDER — IBUPROFEN 600 MG/1
600 TABLET, FILM COATED ORAL EVERY 6 HOURS PRN
Status: DISCONTINUED | OUTPATIENT
Start: 2024-06-26 | End: 2024-06-27 | Stop reason: HOSPADM

## 2024-06-26 RX ORDER — LABETALOL HYDROCHLORIDE 5 MG/ML
10 INJECTION, SOLUTION INTRAVENOUS ONCE
Status: COMPLETED | OUTPATIENT
Start: 2024-06-26 | End: 2024-06-26

## 2024-06-26 RX ORDER — ONDANSETRON 4 MG/1
4 TABLET, ORALLY DISINTEGRATING ORAL EVERY 30 MIN PRN
Status: DISCONTINUED | OUTPATIENT
Start: 2024-06-26 | End: 2024-06-26 | Stop reason: HOSPADM

## 2024-06-26 RX ORDER — SODIUM CHLORIDE, SODIUM LACTATE, POTASSIUM CHLORIDE, CALCIUM CHLORIDE 600; 310; 30; 20 MG/100ML; MG/100ML; MG/100ML; MG/100ML
INJECTION, SOLUTION INTRAVENOUS CONTINUOUS
Status: DISCONTINUED | OUTPATIENT
Start: 2024-06-26 | End: 2024-06-26 | Stop reason: HOSPADM

## 2024-06-26 RX ORDER — ONDANSETRON 2 MG/ML
4 INJECTION INTRAMUSCULAR; INTRAVENOUS EVERY 6 HOURS PRN
Status: DISCONTINUED | OUTPATIENT
Start: 2024-06-26 | End: 2024-06-27 | Stop reason: HOSPADM

## 2024-06-26 RX ORDER — PROCHLORPERAZINE MALEATE 5 MG
5 TABLET ORAL EVERY 6 HOURS PRN
Status: DISCONTINUED | OUTPATIENT
Start: 2024-06-26 | End: 2024-06-27 | Stop reason: HOSPADM

## 2024-06-26 RX ORDER — AMOXICILLIN 250 MG
1 CAPSULE ORAL 2 TIMES DAILY
Status: DISCONTINUED | OUTPATIENT
Start: 2024-06-26 | End: 2024-06-27 | Stop reason: HOSPADM

## 2024-06-26 RX ORDER — DEXAMETHASONE SODIUM PHOSPHATE 4 MG/ML
INJECTION, SOLUTION INTRA-ARTICULAR; INTRALESIONAL; INTRAMUSCULAR; INTRAVENOUS; SOFT TISSUE PRN
Status: DISCONTINUED | OUTPATIENT
Start: 2024-06-26 | End: 2024-06-26

## 2024-06-26 RX ORDER — CEFAZOLIN SODIUM 2 G/100ML
2 INJECTION, SOLUTION INTRAVENOUS EVERY 8 HOURS
Status: COMPLETED | OUTPATIENT
Start: 2024-06-26 | End: 2024-06-27

## 2024-06-26 RX ORDER — CEFAZOLIN SODIUM/WATER 2 G/20 ML
2 SYRINGE (ML) INTRAVENOUS
Status: COMPLETED | OUTPATIENT
Start: 2024-06-26 | End: 2024-06-26

## 2024-06-26 RX ORDER — FENTANYL CITRATE 50 UG/ML
25 INJECTION, SOLUTION INTRAMUSCULAR; INTRAVENOUS EVERY 5 MIN PRN
Status: DISCONTINUED | OUTPATIENT
Start: 2024-06-26 | End: 2024-06-26 | Stop reason: HOSPADM

## 2024-06-26 RX ORDER — DULOXETIN HYDROCHLORIDE 60 MG/1
60 CAPSULE, DELAYED RELEASE ORAL DAILY
Status: DISCONTINUED | OUTPATIENT
Start: 2024-06-26 | End: 2024-06-27 | Stop reason: HOSPADM

## 2024-06-26 RX ORDER — GLYCOPYRROLATE 0.2 MG/ML
INJECTION, SOLUTION INTRAMUSCULAR; INTRAVENOUS PRN
Status: DISCONTINUED | OUTPATIENT
Start: 2024-06-26 | End: 2024-06-26

## 2024-06-26 RX ORDER — ACETAMINOPHEN 325 MG/1
650 TABLET ORAL EVERY 4 HOURS PRN
Qty: 100 TABLET | Refills: 0 | Status: SHIPPED | OUTPATIENT
Start: 2024-06-26

## 2024-06-26 RX ORDER — LIDOCAINE 40 MG/G
CREAM TOPICAL
Status: DISCONTINUED | OUTPATIENT
Start: 2024-06-26 | End: 2024-06-26 | Stop reason: HOSPADM

## 2024-06-26 RX ORDER — SIMVASTATIN 20 MG
20 TABLET ORAL AT BEDTIME
Status: DISCONTINUED | OUTPATIENT
Start: 2024-06-26 | End: 2024-06-27 | Stop reason: HOSPADM

## 2024-06-26 RX ORDER — OXYCODONE HYDROCHLORIDE 10 MG/1
10 TABLET ORAL EVERY 4 HOURS PRN
Status: DISCONTINUED | OUTPATIENT
Start: 2024-06-26 | End: 2024-06-27 | Stop reason: HOSPADM

## 2024-06-26 RX ORDER — ONDANSETRON 2 MG/ML
INJECTION INTRAMUSCULAR; INTRAVENOUS PRN
Status: DISCONTINUED | OUTPATIENT
Start: 2024-06-26 | End: 2024-06-26

## 2024-06-26 RX ORDER — HYDROMORPHONE HCL IN WATER/PF 6 MG/30 ML
0.2 PATIENT CONTROLLED ANALGESIA SYRINGE INTRAVENOUS
Status: DISCONTINUED | OUTPATIENT
Start: 2024-06-26 | End: 2024-06-27 | Stop reason: HOSPADM

## 2024-06-26 RX ORDER — FENTANYL CITRATE 50 UG/ML
50 INJECTION, SOLUTION INTRAMUSCULAR; INTRAVENOUS EVERY 5 MIN PRN
Status: DISCONTINUED | OUTPATIENT
Start: 2024-06-26 | End: 2024-06-26 | Stop reason: HOSPADM

## 2024-06-26 RX ORDER — NALOXONE HYDROCHLORIDE 0.4 MG/ML
0.4 INJECTION, SOLUTION INTRAMUSCULAR; INTRAVENOUS; SUBCUTANEOUS
Status: DISCONTINUED | OUTPATIENT
Start: 2024-06-26 | End: 2024-06-27 | Stop reason: HOSPADM

## 2024-06-26 RX ORDER — ONDANSETRON 4 MG/1
4 TABLET, ORALLY DISINTEGRATING ORAL EVERY 6 HOURS PRN
Status: DISCONTINUED | OUTPATIENT
Start: 2024-06-26 | End: 2024-06-27 | Stop reason: HOSPADM

## 2024-06-26 RX ORDER — SODIUM CHLORIDE, SODIUM LACTATE, POTASSIUM CHLORIDE, CALCIUM CHLORIDE 600; 310; 30; 20 MG/100ML; MG/100ML; MG/100ML; MG/100ML
INJECTION, SOLUTION INTRAVENOUS CONTINUOUS
Status: DISCONTINUED | OUTPATIENT
Start: 2024-06-26 | End: 2024-06-27 | Stop reason: HOSPADM

## 2024-06-26 RX ORDER — NALOXONE HYDROCHLORIDE 0.4 MG/ML
0.2 INJECTION, SOLUTION INTRAMUSCULAR; INTRAVENOUS; SUBCUTANEOUS
Status: DISCONTINUED | OUTPATIENT
Start: 2024-06-26 | End: 2024-06-27 | Stop reason: HOSPADM

## 2024-06-26 RX ORDER — GABAPENTIN 600 MG/1
1200 TABLET ORAL 3 TIMES DAILY
Status: DISCONTINUED | OUTPATIENT
Start: 2024-06-26 | End: 2024-06-27 | Stop reason: HOSPADM

## 2024-06-26 RX ORDER — POLYETHYLENE GLYCOL 3350 17 G/17G
17 POWDER, FOR SOLUTION ORAL DAILY
Status: DISCONTINUED | OUTPATIENT
Start: 2024-06-27 | End: 2024-06-27 | Stop reason: HOSPADM

## 2024-06-26 RX ORDER — TRANEXAMIC ACID 10 MG/ML
1 INJECTION, SOLUTION INTRAVENOUS ONCE
Status: COMPLETED | OUTPATIENT
Start: 2024-06-26 | End: 2024-06-26

## 2024-06-26 RX ORDER — HYDROMORPHONE HCL IN WATER/PF 6 MG/30 ML
0.4 PATIENT CONTROLLED ANALGESIA SYRINGE INTRAVENOUS EVERY 5 MIN PRN
Status: DISCONTINUED | OUTPATIENT
Start: 2024-06-26 | End: 2024-06-26 | Stop reason: HOSPADM

## 2024-06-26 RX ORDER — OXYCODONE HYDROCHLORIDE 5 MG/1
5 TABLET ORAL EVERY 4 HOURS PRN
Status: DISCONTINUED | OUTPATIENT
Start: 2024-06-26 | End: 2024-06-27 | Stop reason: HOSPADM

## 2024-06-26 RX ORDER — HYDROMORPHONE HCL IN WATER/PF 6 MG/30 ML
0.2 PATIENT CONTROLLED ANALGESIA SYRINGE INTRAVENOUS EVERY 5 MIN PRN
Status: DISCONTINUED | OUTPATIENT
Start: 2024-06-26 | End: 2024-06-26 | Stop reason: HOSPADM

## 2024-06-26 RX ORDER — METOPROLOL TARTRATE 1 MG/ML
INJECTION, SOLUTION INTRAVENOUS PRN
Status: DISCONTINUED | OUTPATIENT
Start: 2024-06-26 | End: 2024-06-26

## 2024-06-26 RX ORDER — OXYCODONE HYDROCHLORIDE 5 MG/1
5-10 TABLET ORAL EVERY 4 HOURS PRN
Qty: 30 TABLET | Refills: 0 | Status: SHIPPED | OUTPATIENT
Start: 2024-06-26

## 2024-06-26 RX ORDER — LIDOCAINE 40 MG/G
CREAM TOPICAL
Status: DISCONTINUED | OUTPATIENT
Start: 2024-06-26 | End: 2024-06-27 | Stop reason: HOSPADM

## 2024-06-26 RX ORDER — CEFAZOLIN SODIUM/WATER 2 G/20 ML
2 SYRINGE (ML) INTRAVENOUS SEE ADMIN INSTRUCTIONS
Status: DISCONTINUED | OUTPATIENT
Start: 2024-06-26 | End: 2024-06-26 | Stop reason: HOSPADM

## 2024-06-26 RX ORDER — VANCOMYCIN HYDROCHLORIDE 1 G/20ML
INJECTION, POWDER, LYOPHILIZED, FOR SOLUTION INTRAVENOUS PRN
Status: DISCONTINUED | OUTPATIENT
Start: 2024-06-26 | End: 2024-06-26 | Stop reason: HOSPADM

## 2024-06-26 RX ORDER — DEXAMETHASONE SODIUM PHOSPHATE 4 MG/ML
4 INJECTION, SOLUTION INTRA-ARTICULAR; INTRALESIONAL; INTRAMUSCULAR; INTRAVENOUS; SOFT TISSUE
Status: DISCONTINUED | OUTPATIENT
Start: 2024-06-26 | End: 2024-06-26 | Stop reason: HOSPADM

## 2024-06-26 RX ORDER — METHOCARBAMOL 750 MG/1
750 TABLET, FILM COATED ORAL 4 TIMES DAILY PRN
Qty: 25 TABLET | Refills: 0 | Status: SHIPPED | OUTPATIENT
Start: 2024-06-26

## 2024-06-26 RX ORDER — ONDANSETRON 2 MG/ML
4 INJECTION INTRAMUSCULAR; INTRAVENOUS EVERY 30 MIN PRN
Status: DISCONTINUED | OUTPATIENT
Start: 2024-06-26 | End: 2024-06-26 | Stop reason: HOSPADM

## 2024-06-26 RX ORDER — IBUPROFEN 600 MG/1
600 TABLET, FILM COATED ORAL EVERY 6 HOURS PRN
Qty: 30 TABLET | Refills: 0 | Status: SHIPPED | OUTPATIENT
Start: 2024-06-26

## 2024-06-26 RX ORDER — ACETAMINOPHEN 325 MG/1
975 TABLET ORAL EVERY 8 HOURS
Status: DISCONTINUED | OUTPATIENT
Start: 2024-06-26 | End: 2024-06-27 | Stop reason: HOSPADM

## 2024-06-26 RX ORDER — METOPROLOL TARTRATE 25 MG/1
25 TABLET, FILM COATED ORAL 2 TIMES DAILY
Status: DISCONTINUED | OUTPATIENT
Start: 2024-06-26 | End: 2024-06-27 | Stop reason: HOSPADM

## 2024-06-26 RX ADMIN — SODIUM CHLORIDE, POTASSIUM CHLORIDE, SODIUM LACTATE AND CALCIUM CHLORIDE: 600; 310; 30; 20 INJECTION, SOLUTION INTRAVENOUS at 08:19

## 2024-06-26 RX ADMIN — Medication 2 G: at 07:35

## 2024-06-26 RX ADMIN — FENTANYL CITRATE 100 MCG: 50 INJECTION INTRAMUSCULAR; INTRAVENOUS at 07:38

## 2024-06-26 RX ADMIN — SENNOSIDES AND DOCUSATE SODIUM 1 TABLET: 50; 8.6 TABLET ORAL at 20:32

## 2024-06-26 RX ADMIN — OXYCODONE 10 MG: 10 TABLET ORAL at 21:22

## 2024-06-26 RX ADMIN — GLYCOPYRROLATE 0.2 MG: 0.2 INJECTION, SOLUTION INTRAMUSCULAR; INTRAVENOUS at 07:38

## 2024-06-26 RX ADMIN — METOPROLOL TARTRATE 2 MG: 5 INJECTION INTRAVENOUS at 10:32

## 2024-06-26 RX ADMIN — ROCURONIUM BROMIDE 20 MG: 50 INJECTION, SOLUTION INTRAVENOUS at 08:14

## 2024-06-26 RX ADMIN — GABAPENTIN 1200 MG: 600 TABLET, FILM COATED ORAL at 17:46

## 2024-06-26 RX ADMIN — SODIUM CHLORIDE, POTASSIUM CHLORIDE, SODIUM LACTATE AND CALCIUM CHLORIDE: 600; 310; 30; 20 INJECTION, SOLUTION INTRAVENOUS at 17:51

## 2024-06-26 RX ADMIN — CEFAZOLIN SODIUM 2 G: 2 INJECTION, SOLUTION INTRAVENOUS at 17:50

## 2024-06-26 RX ADMIN — ONDANSETRON 4 MG: 2 INJECTION INTRAMUSCULAR; INTRAVENOUS at 09:55

## 2024-06-26 RX ADMIN — SODIUM CHLORIDE, POTASSIUM CHLORIDE, SODIUM LACTATE AND CALCIUM CHLORIDE: 600; 310; 30; 20 INJECTION, SOLUTION INTRAVENOUS at 06:59

## 2024-06-26 RX ADMIN — SODIUM CHLORIDE, POTASSIUM CHLORIDE, SODIUM LACTATE AND CALCIUM CHLORIDE: 600; 310; 30; 20 INJECTION, SOLUTION INTRAVENOUS at 06:20

## 2024-06-26 RX ADMIN — MIDAZOLAM 1 MG: 1 INJECTION INTRAMUSCULAR; INTRAVENOUS at 07:34

## 2024-06-26 RX ADMIN — ACETAMINOPHEN 975 MG: 325 TABLET, FILM COATED ORAL at 17:45

## 2024-06-26 RX ADMIN — TRANEXAMIC ACID 1 G: 10 INJECTION, SOLUTION INTRAVENOUS at 09:34

## 2024-06-26 RX ADMIN — SIMVASTATIN 20 MG: 20 TABLET, FILM COATED ORAL at 20:32

## 2024-06-26 RX ADMIN — DEXAMETHASONE SODIUM PHOSPHATE 8 MG: 4 INJECTION, SOLUTION INTRA-ARTICULAR; INTRALESIONAL; INTRAMUSCULAR; INTRAVENOUS; SOFT TISSUE at 07:38

## 2024-06-26 RX ADMIN — ROCURONIUM BROMIDE 20 MG: 50 INJECTION, SOLUTION INTRAVENOUS at 08:56

## 2024-06-26 RX ADMIN — GABAPENTIN 1200 MG: 600 TABLET, FILM COATED ORAL at 20:32

## 2024-06-26 RX ADMIN — PROPOFOL 150 MG: 10 INJECTION, EMULSION INTRAVENOUS at 07:38

## 2024-06-26 RX ADMIN — DULOXETINE HYDROCHLORIDE 60 MG: 60 CAPSULE, DELAYED RELEASE ORAL at 17:45

## 2024-06-26 RX ADMIN — HYDROMORPHONE HYDROCHLORIDE 1 MG: 1 INJECTION, SOLUTION INTRAMUSCULAR; INTRAVENOUS; SUBCUTANEOUS at 08:05

## 2024-06-26 RX ADMIN — LABETALOL HYDROCHLORIDE 10 MG: 5 INJECTION, SOLUTION INTRAVENOUS at 10:52

## 2024-06-26 RX ADMIN — ROCURONIUM BROMIDE 50 MG: 50 INJECTION, SOLUTION INTRAVENOUS at 07:38

## 2024-06-26 RX ADMIN — TRANEXAMIC ACID 1 G: 10 INJECTION, SOLUTION INTRAVENOUS at 07:46

## 2024-06-26 RX ADMIN — LIDOCAINE HYDROCHLORIDE 50 MG: 20 INJECTION, SOLUTION INFILTRATION; PERINEURAL at 07:38

## 2024-06-26 RX ADMIN — METOPROLOL TARTRATE 25 MG: 25 TABLET, FILM COATED ORAL at 20:34

## 2024-06-26 RX ADMIN — HYDROMORPHONE HYDROCHLORIDE 1 MG: 1 INJECTION, SOLUTION INTRAMUSCULAR; INTRAVENOUS; SUBCUTANEOUS at 09:48

## 2024-06-26 ASSESSMENT — ACTIVITIES OF DAILY LIVING (ADL)
ADLS_ACUITY_SCORE: 22
ADLS_ACUITY_SCORE: 22
ADLS_ACUITY_SCORE: 23
ADLS_ACUITY_SCORE: 22
ADLS_ACUITY_SCORE: 22
ADLS_ACUITY_SCORE: 21
ADLS_ACUITY_SCORE: 24
ADLS_ACUITY_SCORE: 24
ADLS_ACUITY_SCORE: 22
ADLS_ACUITY_SCORE: 23
ADLS_ACUITY_SCORE: 22
ADLS_ACUITY_SCORE: 24
ADLS_ACUITY_SCORE: 24
ADLS_ACUITY_SCORE: 23
ADLS_ACUITY_SCORE: 22
ADLS_ACUITY_SCORE: 22

## 2024-06-26 ASSESSMENT — ENCOUNTER SYMPTOMS: DYSRHYTHMIAS: 1

## 2024-06-26 ASSESSMENT — LIFESTYLE VARIABLES: TOBACCO_USE: 1

## 2024-06-26 NOTE — ANESTHESIA PROCEDURE NOTES
Airway       Patient location during procedure: OR       Procedure Start/Stop Times: 6/26/2024 7:41 AM  Staff -        Performed By: CRNA  Consent for Airway        Urgency: elective  Indications and Patient Condition       Indications for airway management: toni-procedural and airway protection       Induction type:intravenous       Mask difficulty assessment: 0 - not attempted    Final Airway Details       Final airway type: endotracheal airway       Successful airway: ETT - single and Oral  Endotracheal Airway Details        ETT size (mm): 8.0       Cuffed: yes       Successful intubation technique: direct laryngoscopy       DL Blade Type: Kay 2       Grade View of Cords: 1       Adjucts: stylet       Position: Right       Measured from: lips       Secured at (cm): 23       Bite block used: None    Post intubation assessment        Placement verified by: capnometry and equal breath sounds        Number of attempts at approach: 1       Number of other approaches attempted: 0       Secured with: tape       Ease of procedure: easy       Dentition: Intact    Medication(s) Administered   Medication Administration Time: 6/26/2024 7:41 AM

## 2024-06-26 NOTE — PLAN OF CARE
Patient vital signs are at baseline: No, 1L/min of Oxygen.   Patient able to ambulate as they were prior to admission or with assist devices provided by therapies during their stay:  No,  Reason:  up to edge of the bed X2 assist with GB  Patient MUST void prior to discharge:  No,  Reason:  Urinal at edge of bed. 150 mls output  Patient able to tolerate oral intake:  Yes, denies nausea and vomiting. Ate chex mix and water  Pain has adequate pain control using Oral analgesics:  No,  Reason:  patient denies pain  Does patient have an identified :  Yes, significant other at bedside      Pending plans to discharge.

## 2024-06-26 NOTE — ANESTHESIA CARE TRANSFER NOTE
Patient: Carson Zurita    Procedure: Procedure(s):  Right total hip arthroplasty       Diagnosis: Osteoarthritis of right hip [M16.11]  Diagnosis Additional Information: No value filed.    Anesthesia Type:   General     Note:    Oropharynx: spontaneously breathing  Level of Consciousness: drowsy  Oxygen Supplementation: face mask    Independent Airway: airway patency satisfactory and stable  Dentition: dentition unchanged    Report to RN Given: handoff report given  Patient transferred to: PACU    Handoff Report: Identifed the Patient, Identified the Reponsible Provider, Reviewed the pertinent medical history, Discussed the surgical course, Reviewed Intra-OP anesthesia mangement and issues during anesthesia, Set expectations for post-procedure period and Allowed opportunity for questions and acknowledgement of understanding      Vitals:  Vitals Value Taken Time   BP     Temp     Pulse 117 06/26/24 1047   Resp 18 06/26/24 1047   SpO2 100 % 06/26/24 1047   Vitals shown include unfiled device data.    Electronically Signed By: MORA uRbio CRNA  June 26, 2024  10:48 AM

## 2024-06-26 NOTE — ANESTHESIA POSTPROCEDURE EVALUATION
Patient: Carson Zurita    Procedure: Procedure(s):  Right total hip arthroplasty       Anesthesia Type:  General    Note:  Disposition: Admission   Postop Pain Control: Uneventful            Sign Out: Well controlled pain   PONV: No   Neuro/Psych: Uneventful            Sign Out: Acceptable/Baseline neuro status   Airway/Respiratory: Uneventful            Sign Out: Acceptable/Baseline resp. status   CV/Hemodynamics: Uneventful            Sign Out: Acceptable CV status; No obvious hypovolemia; No obvious fluid overload   Other NRE: NONE   DID A NON-ROUTINE EVENT OCCUR? No           Last vitals:  Vitals Value Taken Time   /92 06/26/24 1130   Temp 97.4  F (36.3  C) 06/26/24 1045   Pulse 78 06/26/24 1143   Resp 18 06/26/24 1143   SpO2 97 % 06/26/24 1143   Vitals shown include unfiled device data.    Electronically Signed By: Lukasz Garcia MD  June 26, 2024  11:44 AM

## 2024-06-26 NOTE — OR NURSING
"Bladder scan showing 659 ml. Pt attempts to void in urinal are unsuccessful. Pt refuses straight cath to empty bladder states he \"needs more time\" and \"will get it out.\"   "

## 2024-06-26 NOTE — ANESTHESIA PREPROCEDURE EVALUATION
Anesthesia Pre-Procedure Evaluation    Patient: Carson Zurita   MRN: [id number] : 1950        Procedure : Procedure(s):  Right total hip arthroplasty          Past Medical History:   Diagnosis Date    Arrhythmia     Afib    Arthritis     Gout     Hypertension     Sleep apnea     Uses a CPAP      Past Surgical History:   Procedure Laterality Date    ARTHROPLASTY HIP Left     CATARACT IOL, RT/LT Bilateral     COLECTOMY SUBTOTAL      REVERSE ARTHROPLASTY SHOULDER Left 2023    Procedure: 1.  Left Reverse shoulder arthroplasty with computer navigation 2.  Left Long head of biceps tenodesis;  Surgeon: Soy Sanchez MD;  Location: [facility] OR    SHOULDER SURGERY Left       No Known Allergies   Social History     Tobacco Use    Smoking status: Every Day     Current packs/day: 1.00     Types: Cigarettes    Smokeless tobacco: Never   Substance Use Topics    Alcohol use: Yes     Comment: 1-2 before supper ans 1-2 after supper daily      Wt Readings from Last 1 Encounters:   24 105.2 kg (232 lb)        Anesthesia Evaluation            ROS/MED HX  ENT/Pulmonary:     (+) sleep apnea,               tobacco use,                        Neurologic:       Cardiovascular: Comment: Bifascicular block    (+)  hypertension- -   -  - -   Taking blood thinners                     dysrhythmias, a-fib,             METS/Exercise Tolerance:     Hematologic:       Musculoskeletal:       GI/Hepatic:       Renal/Genitourinary:       Endo:       Psychiatric/Substance Use:       Infectious Disease:       Malignancy:       Other:            Physical Exam    Airway        Mallampati: II   TM distance: > 3 FB   Neck ROM: full   Mouth opening: > 3 cm    Respiratory Devices and Support         Dental       (+) Edentulous      Cardiovascular   cardiovascular exam normal          Pulmonary           (+) wheezes           OUTSIDE LABS:  CBC:   Lab Results   Component Value Date    HGB 12.3 (L) 2023     BMP:   Lab Results  "  Component Value Date     (H) 11/02/2023     (H) 11/01/2023     COAGS: No results found for: \"PTT\", \"INR\", \"FIBR\"  POC: No results found for: \"BGM\", \"HCG\", \"HCGS\"  HEPATIC: No results found for: \"ALBUMIN\", \"PROTTOTAL\", \"ALT\", \"AST\", \"GGT\", \"ALKPHOS\", \"BILITOTAL\", \"BILIDIRECT\", \"DIOGENES\"  OTHER: No results found for: \"PH\", \"LACT\", \"A1C\", \"PEPPER\", \"PHOS\", \"MAG\", \"LIPASE\", \"AMYLASE\", \"TSH\", \"T4\", \"T3\", \"CRP\", \"SED\"    Anesthesia Plan    ASA Status:  3    NPO Status:  NPO Appropriate    Anesthesia Type: General.     - Airway: ETT   Induction: Intravenous.   Maintenance: Balanced.        Consents    Anesthesia Plan(s) and associated risks, benefits, and realistic alternatives discussed. Questions answered and patient/representative(s) expressed understanding.     - Discussed:     - Discussed with:  Patient            Postoperative Care    Pain management: IV analgesics, Oral pain medications, Multi-modal analgesia.   PONV prophylaxis: Ondansetron (or other 5HT-3), Dexamethasone or Solumedrol     Comments:               Kita Naranjo MD    I have reviewed the pertinent notes and labs in the chart from the past 30 days and (re)examined the patient.  Any updates or changes from those notes are reflected in this note.            # Drug Induced Coagulation Defect: home medication list includes an anticoagulant medication   # Obesity: Estimated body mass index is 32.37 kg/m  as calculated from the following:    Height as of this encounter: 1.803 m (5' 10.98\").    Weight as of this encounter: 105.2 kg (232 lb).      "

## 2024-06-26 NOTE — OR NURSING
On arrival to PACU pt hypertensive and tachycardiac. Dr. Garcia notified and came to bedside to assess pt. Medication for labetalol received and EKG obtained. Dr. Garcia reviewed EKG. Heart rate and blood pressure responded well to medication. Will continue to monitor.

## 2024-06-26 NOTE — OP NOTE
[facility]  Orthopedic Operative Note    Total Hip Arthroplasty    Carson Zurita MRN# [id number]   YOB: 1950  Procedure Date:  6/26/2024  Age: 73 year old       PREOPERATIVE DIAGNOSIS:    Right hip advanced osteoarthritis  Atrial fibrillation on Xarelto  Asthma with hx of smoking      POSTOPERATIVE DIAGNOSIS:    Same    PROCEDURE PERFORMED:    Right total hip arthroplasty, anterolateral approach.    SURGEON:  CHRISTOPHER HERRERA MD    FIRST ASSISTANT:  Christal Vargas PA-C, whose was critical for positioning, retraction during exposure, placement of implants, and closure.     ANESTHESIA:  General + FI Block     ESTIMATED BLOOD LOSS:  150 mL.       IMPLANTS:  Implant Name Type Inv. Item Serial No.  Lot No. LRB No. Used Action   TRIDENT II TRITANIUM CLUSTERHOLE 60G - [id number] Total Joint Component/Insert TRIDENT II TRITANIUM CLUSTERHOLE 60G  [employer] [id number] Right 1 Implanted   INSERT ACE 36MM 0D G HIP X3 TRDNT STRL  723-00-36G - [id number] Total Joint Component/Insert INSERT ACE 36MM 0D G HIP X3 TRDNT STRL -00-36G  [employer] [id number] Right 1 Implanted   6.5MM LOW PROFILE HEX SCR 35MM - [id number] Metallic Hardware/[place] 6.5MM LOW PROFILE HEX SCR 35MM  [employer] [id number] Right 1 Implanted   6.5MM LOW PROFILE HEX SCR 35MM - [id number] Metallic Hardware/[place] 6.5MM LOW PROFILE HEX SCR 35MM  [employer] GMB Right 1 Implanted   IMPLANT HIP 41MM NK INSG 7 HPSTM HI OFST 109MM 6814-3390 - [id number] Total Joint Component/Insert IMPLANT HIP 41MM NK INSG 7 HPSTM HI OFST 109MM [id number]  [employer] [id number] Right 1 Implanted   IMP HEAD FEMORAL STRK BIOLOX DELTA CERAMIC 36MM +2.5MM - [id number] Total Joint Component/Insert IMP HEAD FEMORAL STRK BIOLOX DELTA CERAMIC 36MM +2.5MM  [employer] [id number] Right 1 Implanted        INDICATIONS FOR PROCEDURE: Carson Zurita is a 72-year-old male who presents with right hip  pain secondary to osteoarthritis.  He previously underwent a left total hip arthroplasty and a left reverse shoulder arthroplasty.  He is doing well regarding his prior joint replacements.  His right hip pain has been gradually worsening.  Radiographs reveal advanced hip osteoarthritis.  He does have a history of lumbar back pain and a prior disc herniation.  On exam he does locate pain to his groin and has hip pain with range of motion.    We discussed nonoperative and various operative treatment options including hemiarthroplasty and total hip arthroplasty. Risks of bleeding, infection, damage to surrounding neurovascular structures, hip dislocation, intraoperative or postoperative periprosthetic fracture, leg length inequality, blood clots including deep vein thrombosis and pulmonary embolism and anesthetic complications were discussed with patient.  Considering he has failed nonoperative treatment and has done well with prior joint replacements, he would like to proceed with surgery.    Benefits of surgery discussed included pain relief.  Alternatives include nonoperative management was previously discussed.  The patient and his wife understands and wishes to proceed.  Consent signed by patient.      DESCRIPTION OF PROCEDURE:  The patient was identified in the preoperative holding area per hospital policy and the correct operative site marked. Patient was brought into the to the operating room and placed supine on the operating room table.  After induction of general anesthesia the patient was placed into a lateral decubitus position on hip positioner and all bony prominences well-padded. Chlorhexidine prescrub was performed followed by prepping and draping in normal sterile fashion.  Antibiotic administration, Ancef, and trans-examic acid administration were confirmed and timeout was performed per standard protocol.      A lateral incision was made centered over the greater trochanter proceeding sharply the  skin and subtenons tissue down the fascia.  Fascia was incised in line with the incision.  A bursectomy was performed.  The anterior one third of the abductors were lifted off the anterior aspect of the femur using cautery.  A #5 Ethibond was used to tag of the gluteus medius.  A T-capsulotomy was performed.  The gluteus minimus and capsule were tagged with a #5 Ethibond.  Posteromedial release was performed extending to the superior aspect the lesser trochanter.  The femoral neck was cut maintaining approximately 12 mm of neck proximal to the lesser trochanter. The head was removed with a corkscrew and cochran and measured 55mm.  The hip was found to have significant osteoarthritis and loss of cartilage.      We then turned our attention to preparation of the acetabulum.  A pointed Hohmann was placed along the anterior aspect of acetabulum.  A #2 retractor was placed posteriorly.  The inferior capsule was then released.  Pituitary and cautery was used to remove calcified labrum.  A rongeur was used to remove surrounding osteophytes.  The pulvinar was then removed cautery.  The transverse acetabular ligament was identified for orientating reaming and the final implant.    The acetabulum was then subsequently reamed to a size 60.  A 60trial was used and found to be a good fit.  A Malcolm Trident 60mm cluster cup was utilized.  The cup was placed in approximately 45 degrees of abduction and 15-20 degrees of anteversion. Two acetabular screws were placed, 35mm each. A polyethylene insert was then placed for 36 mm head.    We then turned our attention to preparation of the femur.  A femoral elevator and a pointed Hohmann were used for visualization.  The greater trochanter was identified and a  was utilized to remove the lateral femoral neck bone.  The femur was then instrumented with a canal finder followed by a lateralizing reamer.  The femoral canal was identified and broached to a size 7.  A trial neck and  ball were placed and the hip was reduced.      The trial implants were removed and a [place] [employer] size 7 stem was placed.  We then trialed a size 2.5 head and reduced the hip. The hip was ranged in flexion extension as well as internal and external rotation and felt to be stable in all directions.  There is no impingement.  The patient was found to have good leg lengths.    We placed the final implant 2.5 head.  The hip was gently reduced.  Again the hip was ranged in all directions and found to be stable.    The wound was then copiously irrigated with pulselavac.  We repaired the capsule with #5 Ethibond.  Vancomycin powder was placed the gluteus medius and minimus were then repaired with 3 Ethibond sutures which were placed in a Efrain-Nils stitch fashion and passed through 3 bone tunnels.  This layer was then oversewn with figure 8 Ethibond suture and a running #1 Ethibond.  The IT band was then closed with #1 Vicryl and a running Stratafix.  The wound was then copiously irrigated.      The abductors and deep layers of the hip were then injected with potion consisting of bupivacaine, Toradol and tranexamic acid.  The superficial layers were then closed with 0 Vicryl 2-0 Monocryl running 3-0 Monocryl.  Dermabond was placed on the skin.  An Aquacel dressing was then placed over the skin     The patient was awoken from anesthesia and transported to the recovery room in stable condition, sustaining no complications.      Plan:  1.  Weightbearing as tolerated with assistive devices as needed x 6 weeks and and as needed thereafter.   2.  Ancef x 24 hours.   3.  Aspirin enteric coated 325 mg daily for DVT prophylaxis for 6 weeks.   4.  PACU x-rays AP pelvis/Crosstable hip lateral xray  5.  Physical therapy while in the hospital. Outpatient PT in [place] is scheduled.  6.  Follow-up in 2-week wound check with x-rays .     Post-op appointment is scheduled Dr. Soy Sanchez's clinic in 2 weeks.     Dr. Sanchez's  care coordinator is Destiney Wells. Please contact her at [phone] to schedule an appointment.      Dr. Sanchez sees patients at 2 clinic locations:  [facility] [place]  [address] [place] [address]  [facility] [place]   [address] , [address], [place] [address]      Please call the on-call phone number [phone] during evenings, nights and weekends for any urgent needs. Prescription refills must be done during business hours by calling [phone]      Soy Sanchez MD  [facility]  [phone] (clinic)

## 2024-06-27 ENCOUNTER — APPOINTMENT (OUTPATIENT)
Dept: OCCUPATIONAL THERAPY | Facility: CLINIC | Age: 74
End: 2024-06-27
Attending: STUDENT IN AN ORGANIZED HEALTH CARE EDUCATION/TRAINING PROGRAM
Payer: MEDICARE

## 2024-06-27 ENCOUNTER — APPOINTMENT (OUTPATIENT)
Dept: PHYSICAL THERAPY | Facility: CLINIC | Age: 74
End: 2024-06-27
Attending: STUDENT IN AN ORGANIZED HEALTH CARE EDUCATION/TRAINING PROGRAM
Payer: MEDICARE

## 2024-06-27 VITALS
SYSTOLIC BLOOD PRESSURE: 148 MMHG | DIASTOLIC BLOOD PRESSURE: 83 MMHG | RESPIRATION RATE: 16 BRPM | HEART RATE: 71 BPM | BODY MASS INDEX: 32.48 KG/M2 | HEIGHT: 71 IN | TEMPERATURE: 97.6 F | OXYGEN SATURATION: 94 % | WEIGHT: 232 LBS

## 2024-06-27 LAB
CREAT SERPL-MCNC: 0.61 MG/DL (ref 0.67–1.17)
EGFRCR SERPLBLD CKD-EPI 2021: >90 ML/MIN/1.73M2
FASTING STATUS PATIENT QL REPORTED: ABNORMAL
GLUCOSE SERPL-MCNC: 131 MG/DL (ref 70–99)
HGB BLD-MCNC: 10.5 G/DL (ref 13.3–17.7)

## 2024-06-27 PROCEDURE — 36415 COLL VENOUS BLD VENIPUNCTURE: CPT

## 2024-06-27 PROCEDURE — 250N000013 HC RX MED GY IP 250 OP 250 PS 637

## 2024-06-27 PROCEDURE — 82565 ASSAY OF CREATININE: CPT | Performed by: STUDENT IN AN ORGANIZED HEALTH CARE EDUCATION/TRAINING PROGRAM

## 2024-06-27 PROCEDURE — 97535 SELF CARE MNGMENT TRAINING: CPT | Mod: GO | Performed by: REHABILITATION PRACTITIONER

## 2024-06-27 PROCEDURE — 97165 OT EVAL LOW COMPLEX 30 MIN: CPT | Mod: GO | Performed by: REHABILITATION PRACTITIONER

## 2024-06-27 PROCEDURE — 82947 ASSAY GLUCOSE BLOOD QUANT: CPT | Performed by: STUDENT IN AN ORGANIZED HEALTH CARE EDUCATION/TRAINING PROGRAM

## 2024-06-27 PROCEDURE — 97530 THERAPEUTIC ACTIVITIES: CPT | Mod: GP | Performed by: PHYSICAL THERAPIST

## 2024-06-27 PROCEDURE — 94640 AIRWAY INHALATION TREATMENT: CPT

## 2024-06-27 PROCEDURE — 250N000011 HC RX IP 250 OP 636: Mod: JZ

## 2024-06-27 PROCEDURE — 85018 HEMOGLOBIN: CPT

## 2024-06-27 PROCEDURE — 97116 GAIT TRAINING THERAPY: CPT | Mod: GP,KX | Performed by: PHYSICAL THERAPIST

## 2024-06-27 RX ADMIN — GABAPENTIN 1200 MG: 600 TABLET, FILM COATED ORAL at 09:11

## 2024-06-27 RX ADMIN — ACETAMINOPHEN 975 MG: 325 TABLET, FILM COATED ORAL at 00:05

## 2024-06-27 RX ADMIN — OXYCODONE 10 MG: 10 TABLET ORAL at 12:09

## 2024-06-27 RX ADMIN — SENNOSIDES AND DOCUSATE SODIUM 1 TABLET: 50; 8.6 TABLET ORAL at 09:12

## 2024-06-27 RX ADMIN — OXYCODONE HYDROCHLORIDE 5 MG: 5 TABLET ORAL at 04:53

## 2024-06-27 RX ADMIN — METHOCARBAMOL 500 MG: 500 TABLET ORAL at 00:06

## 2024-06-27 RX ADMIN — DULOXETINE HYDROCHLORIDE 60 MG: 60 CAPSULE, DELAYED RELEASE ORAL at 09:12

## 2024-06-27 RX ADMIN — METHOCARBAMOL 500 MG: 500 TABLET ORAL at 06:44

## 2024-06-27 RX ADMIN — UMECLIDINIUM 1 PUFF: 62.5 AEROSOL, POWDER ORAL at 08:17

## 2024-06-27 RX ADMIN — ACETAMINOPHEN 975 MG: 325 TABLET, FILM COATED ORAL at 06:44

## 2024-06-27 RX ADMIN — IBUPROFEN 600 MG: 600 TABLET, FILM COATED ORAL at 04:53

## 2024-06-27 RX ADMIN — CEFAZOLIN SODIUM 2 G: 2 INJECTION, SOLUTION INTRAVENOUS at 00:07

## 2024-06-27 RX ADMIN — METOPROLOL TARTRATE 25 MG: 25 TABLET, FILM COATED ORAL at 09:11

## 2024-06-27 ASSESSMENT — ACTIVITIES OF DAILY LIVING (ADL)
ADLS_ACUITY_SCORE: 23
IADL_COMMENTS: SPOUSE TO COMPLETE
ADLS_ACUITY_SCORE: 23

## 2024-06-27 NOTE — PROGRESS NOTES
Occupational Therapy Discharge Summary    Reason for therapy discharge:    All goals and outcomes met, no further needs identified.    Progress towards therapy goal(s). See goals on Care Plan in [facility] electronic health record for goal details.  Goals met    Therapy recommendation(s):    No further therapy is recommended.

## 2024-06-27 NOTE — PROGRESS NOTES
Orthopedics Daily Progress Note  Carson Zurita  June 27, 2024  Date of Admission: 6/26/2024      Post Op Day: 1 Day Post-Op   Procedures: Procedure(s):  Right total hip arthroplasty      Interval History:  Laying in bed comfortably. Wife at bedside.   Slept well overnight.   Pain controlled when laying still, but does not pain with any shift in position.   No block done in surgery, only local provided intra op.   Discussed ongoing restrictions, rehab progression, and expected course of pain and return to baseline.  Denies CP/SOB. Denies N/V.  Tolerating orals.  All questions/concerns addressed.      Temp:  [97  F (36.1  C)-97.6  F (36.4  C)] 97.6  F (36.4  C)  Pulse:  [] 71  Resp:  [10-19] 16  BP: (129-164)/() 148/83  SpO2:  [91 %-100 %] 94 %    Physical Exam:  Alert, appropriate, and following commands  Breathing easily without wheeze  Dressing/wound c/d/I  Able to complete SLR  Bilateral calves soft, compressible, non tender  5/5 df/pf/ehl, SILT, palpable dp pulse, normal cap refill      Labs/Imaging:  Results for orders placed or performed during the hospital encounter of 06/26/24 (from the past 24 hour(s))   EKG 12-lead, tracing only   Result Value Ref Range    Systolic Blood Pressure  mmHg    Diastolic Blood Pressure  mmHg    Ventricular Rate 84 BPM    Atrial Rate  BPM    PA Interval  ms    QRS Duration 168 ms     ms    QTc 486 ms    P Axis  degrees    R AXIS -68 degrees    T Axis 25 degrees    Interpretation ECG       Atrial fibrillation  Right bundle branch block  Left anterior fascicular block  ** Bifascicular block **  Septal infarct , age undetermined  Abnormal ECG     XR Pelvis w Hip Port Right 1 View    Narrative    XR PELVIS AND HIP PORTABLE RIGHT 1 VIEW 6/26/2024 11:52 AM     HISTORY: Status post Hip surgery    COMPARISON: None.       Impression    IMPRESSION: Postoperative changes bilateral total hip arthroplasty.  Additional cerclage wire fixation on the left. Components appear  well  seated bilaterally. Visualized pelvis negative for fracture.  Postprocedural air lateral to the right hip.    ALLIE FONTENOT MD         SYSTEM ID:  [id number]   Hemoglobin   Result Value Ref Range    Hemoglobin 10.5 (L) 13.3 - 17.7 g/dL   Creatinine   Result Value Ref Range    Creatinine 0.61 (L) 0.67 - 1.17 mg/dL    GFR Estimate >90 >60 mL/min/1.73m2   Glucose   Result Value Ref Range    Glucose 131 (H) 70 - 99 mg/dL    Patient Fasting > 8hrs? Unknown          A/P - 73 year old male s/p right total hip arthroplasty    PACU xrays reviewed. Hardware in good position and well seated. Appears intact.     Hbg 10.5 this AM. Normal for acute blood loss anemia. Asymptomatic at this time. Will continue to monitor his symptoms.       DVT proph: resume PTA xeralto   Abx: ancef x 24 hours post op  Activity: WBAT with walker. Anterior hip precautions of no adduction past midline, caution with pivoting and hyperextention. PT/OT.  Dressing: Aquacel. Keep intact.  Change of greater than 60% saturated. May shower POD3.   Pain Management: Continue pain regimen.  Encouraged multiple modalities, ice.  Encourage elevation.    Follow Up: 7/10/24 at 1:20 pm at [facility]. Start PT within 3-5 days post op.     Dispo: Home today, pending PT/OT sign off.      Christal Vargas PA-C  [facility]

## 2024-06-27 NOTE — PROGRESS NOTES
06/27/24 0914   Appointment Info   Signing Clinician's Name / Credentials (OT) Marta Jeffers, OTR/L   Rehab Comments (OT) R CHRIS, WBAT, no ADD, pivot-turning, Hyperextension   Living Environment   People in Home spouse   Current Living Arrangements house   Home Accessibility stairs within home   Number of Stairs, Main Entrance 3   Transportation Anticipated car, drives self;family or friend will provide   Living Environment Comments Pt currently does not use any AD, but has cane, FWW, and crutch at home from L CHRIS 20+ years ago. walk in shower, elevated toilet   Self-Care   Usual Activity Tolerance excellent   Current Activity Tolerance good   Regular Exercise No   Equipment Currently Used at Home grab bar, tub/shower   Fall history within last six months no   Activity/Exercise/Self-Care Comment I with ADl.IADls   Instrumental Activities of Daily Living (IADL)   IADL Comments spouse to complete   General Information   Onset of Illness/Injury or Date of Surgery 06/26/24   Referring Physician Soy Sanchez MD   Patient/Family Therapy Goal Statement (OT) to return home today   Additional Occupational Profile Info/Pertinent History of Current Problem patient is POD #1 for R CHRIS   Existing Precautions/Restrictions no hip ADD past midline;no hip hyperextension;no pivoting or twisting   General Observations and Info pt in chair and spouse present   Cognitive Status Examination   Orientation Status orientation to person, place and time   Sensory   Sensory Quick Adds sensation intact   Pain Assessment   Patient Currently in Pain Yes, see Vital Sign flowsheet  (ratign pain 6-7/10)   Posture   Posture not impaired   Range of Motion Comprehensive   General Range of Motion bilateral upper extremity ROM WNL   Strength Comprehensive (MMT)   General Manual Muscle Testing (MMT) Assessment upper extremity strength deficits identified   Upper Extremity (Manual Muscle Testing)   Comment, MMT: Upper Extremity decreased strength  in R LE to be expected   Muscle Tone Assessment   Muscle Tone Quick Adds No deficits were identified   Coordination   Upper Extremity Coordination No deficits were identified   Transfers   Transfers toilet transfer;shower transfer   Transfer Comments CGA, fww sit<>stand   Shower Transfer   [place] Level (Shower Transfer) contact guard;verbal cues   Toilet Transfer   Type (Toilet Transfer) stand-sit   [place] Level (Toilet Transfer) verbal cues;contact guard   Balance   Balance Comments LOB ws not noted, general unsteadiness to be expected   Activities of Daily Living   BADL Assessment/Intervention lower body dressing   Lower Body Dressing Assessment/Training   [place] Level (Lower Body Dressing) moderate assist (50% patient effort)   Clinical Impression   Criteria for Skilled Therapeutic Interventions Met (OT) Yes, treatment indicated   OT Diagnosis decreased ADls/IADls   OT Problem List-Impairments impacting ADL activity tolerance impaired;balance;pain;strength;range of motion (ROM);post-surgical precautions   Assessment of Occupational Performance 5 or more Performance Deficits   Identified Performance Deficits Dsg, toileting, bathing, functional/community mobility, household chores, driving, errands   Planned Therapy Interventions (OT) ADL retraining;progressive activity/exercise;transfer training   Risk & Benefits of therapy have been explained evaluation/treatment results reviewed;care plan/treatment goals reviewed;risks/benefits reviewed;current/potential barriers reviewed;patient;spouse/significant other   OT Total Evaluation Time   OT Eval, Low Complexity Minutes (94992) 8   OT Goals   Therapy Frequency (OT) One time eval and treatment   OT Predicted Duration/Target Date for Goal Attainment 06/27/24   OT Goals Lower Body Dressing;Transfers;Toilet Transfer/Toileting;OT Goal 1;OT Goal 2   OT: Lower Body Dressing Minimal assist;using adaptive equipment;within precautions;Goal Met   OT: Transfer  Supervision/stand-by assist;with assistive device;within precautions  (walk in shower)   OT: Toilet Transfer/Toileting Supervision/stand-by assist;toilet transfer;cleaning and garment management;using adaptive equipment;within precautions;Goal Met   OT: Goal 1 Patient will verbalize at least 2-3 adaptations to ADLs routines at home to accommodate energy level and safety needs.- goal met   OT: Goal 2 Patient will demonstrate safe use of walker during ADLs.- goal met   OT Discharge Planning   OT Plan dc   OT Rationale for DC Rec defer to ortho team for dc plan- patient has met needed goals for safe discharge home with family to A as needed with IADL's; household chores, driving, errands, cooking and bathing.Pt has needed AE. Will discharge from OT services.   OT Brief overview of current status SBA, fww transfers, mobility and ADLs   Total Session Time   Total Session Time (sum of timed and untimed services) 8

## 2024-06-27 NOTE — PLAN OF CARE
Goal Outcome Evaluation:      Patient vital signs are at baseline: Yes  Patient able to ambulate as they were prior to admission or with assist devices provided by therapies during their stay:  Yes  Patient MUST void prior to discharge:  Yes  Patient able to tolerate oral intake:  Yes  Pain has adequate pain control using Oral analgesics:  Yes  Does patient have an identified :  Yes  Has goal D/C date and time been discussed with patient:  Yes    CMS intact. Dressing CDI. Denied nausea and SOB. CPAP on at night.       Problem: Adult Inpatient Plan of Care  Goal: Plan of Care Review    Outcome: Progressing  Flowsheets (Taken 6/27/2024 0246)  Plan of Care Reviewed With:   patient   spouse  Overall Patient Progress: improving  Goal: Patient-Specific Goal (Individualized)    Outcome: Progressing  Goal: Absence of Hospital-Acquired Illness or Injury  Outcome: Progressing  Intervention: Identify and Manage Fall Risk  Recent Flowsheet Documentation  Taken 6/27/2024 0011 by Yoly Waterman RN  Safety Promotion/Fall Prevention:   activity supervised   clutter free environment maintained   increased rounding and observation   increase visualization of patient   lighting adjusted   mobility aid in reach   nonskid shoes/slippers when out of bed   room organization consistent   safety round/check completed   supervised activity  Intervention: Prevent and Manage VTE (Venous Thromboembolism) Risk  Recent Flowsheet Documentation  Taken 6/27/2024 0011 by Yoly Waterman RN  VTE Prevention/Management: SCDs (sequential compression devices) on  Intervention: Prevent Infection  Recent Flowsheet Documentation  Taken 6/27/2024 0011 by Yoly Waterman, RN  Infection Prevention:   hand hygiene promoted   rest/sleep promoted  Goal: Optimal Comfort and Wellbeing  Outcome: Progressing  Goal: Readiness for Transition of Care  Outcome: Progressing     Problem: Hip Arthroplasty  Goal: Optimal  Coping  Outcome: Progressing  Goal: Absence of Bleeding  Outcome: Progressing  Goal: Effective Bowel Elimination  Outcome: Progressing  Goal: Fluid and Electrolyte Balance  Outcome: Progressing  Goal: Optimal Functional Ability  Outcome: Progressing  Goal: Absence of Infection Signs and Symptoms  Outcome: Progressing  Goal: Intact Neurovascular Status  Outcome: Progressing  Goal: Anesthesia/Sedation Recovery  Outcome: Progressing  Intervention: Optimize Anesthesia Recovery  Recent Flowsheet Documentation  Taken 6/27/2024 0011 by Yoly Waterman RN  Safety Promotion/Fall Prevention:   activity supervised   clutter free environment maintained   increased rounding and observation   increase visualization of patient   lighting adjusted   mobility aid in reach   nonskid shoes/slippers when out of bed   room organization consistent   safety round/check completed   supervised activity  Goal: Acceptable Pain Control  Outcome: Progressing  Goal: Nausea and Vomiting Relief  Outcome: Progressing  Goal: Effective Urinary Elimination  Outcome: Progressing  Goal: Effective Oxygenation and Ventilation  Outcome: Progressing

## 2024-06-27 NOTE — PLAN OF CARE
"Goal Outcome Evaluation:      Plan of Care Reviewed With: patient, spouse    The patient remains set for discharge. Education completed and provided. PRN oxy given for pain. Vitally stable on room air. Ambulating assist x1.     Problem: Adult Inpatient Plan of Care  Goal: Plan of Care Review  Description: The Plan of Care Review/Shift note should be completed every shift.  The Outcome Evaluation is a brief statement about your assessment that the patient is improving, declining, or no change.  This information will be displayed automatically on your shift  note.  Outcome: Progressing  Flowsheets (Taken 6/27/2024 1135)  Plan of Care Reviewed With:   patient   spouse  Goal: Patient-Specific Goal (Individualized)  Description: You can add care plan individualizations to a care plan. Examples of Individualization might be:  \"Parent requests to be called daily at 9am for status\", \"I have a hard time hearing out of my right ear\", or \"Do not touch me to wake me up as it startles  me\".  Outcome: Progressing  Goal: Absence of Hospital-Acquired Illness or Injury  Outcome: Progressing  Intervention: Identify and Manage Fall Risk  Recent Flowsheet Documentation  Taken 6/27/2024 0830 by Arin Ribeiro, RN  Safety Promotion/Fall Prevention: safety round/check completed  Intervention: Prevent Infection  Recent Flowsheet Documentation  Taken 6/27/2024 0830 by Arin Ribeiro RN  Infection Prevention: hand hygiene promoted  Goal: Optimal Comfort and Wellbeing  Outcome: Progressing  Goal: Readiness for Transition of Care  Outcome: Progressing     Problem: Hip Arthroplasty  Goal: Optimal Coping  Outcome: Progressing  Goal: Absence of Bleeding  Outcome: Progressing  Goal: Effective Bowel Elimination  Outcome: Progressing  Goal: Fluid and Electrolyte Balance  Outcome: Progressing  Goal: Optimal Functional Ability  Outcome: Progressing  Intervention: Promote Optimal Functional Status  Recent Flowsheet Documentation  Taken 6/27/2024 " 0830 by Arin Ribeiro, RN  Assistive Device Utilized:   gait belt   walker  Activity Management: activity adjusted per tolerance  Goal: Absence of Infection Signs and Symptoms  Outcome: Progressing  Goal: Intact Neurovascular Status  Outcome: Progressing  Goal: Anesthesia/Sedation Recovery  Outcome: Progressing  Intervention: Optimize Anesthesia Recovery  Recent Flowsheet Documentation  Taken 6/27/2024 0830 by Arin Ribeiro, RN  Safety Promotion/Fall Prevention: safety round/check completed  Goal: Acceptable Pain Control  Outcome: Progressing  Goal: Nausea and Vomiting Relief  Outcome: Progressing  Goal: Effective Urinary Elimination  Outcome: Progressing  Goal: Effective Oxygenation and Ventilation  Outcome: Progressing

## 2024-06-27 NOTE — PROGRESS NOTES
06/26/24 1710   Appointment Info   Signing Clinician's Name / Credentials (PT) MARTHA Valdivia   Student Supervision Direct supervision provided;Direct Patient Contact Provided;Therapy services provided with the co-signing licensed therapist guiding and directing the services, and providing the skilled judgement and assessment throughout the session   Rehab Comments (PT) R CHRIS, WBAT, no ADD, pivot-turning, Hyperextension   Living Environment   People in Home spouse   Current Living Arrangements house   Home Accessibility stairs to enter home   Number of Stairs, Main Entrance 3   Stair Railings, Main Entrance railings safe and in good condition   Number of Stairs, Within Home, Primary other (see comments)  (Two level house with 10+ stair, pt has all needs on main level)   Stair Railings, Within Home, Primary railings safe and in good condition   Transportation Anticipated car, drives self;family or friend will provide   Living Environment Comments Pt currently does not use any AD, but has cane, FWW, and crutch at home from L CHRIS 20+ years ago   Self-Care   Usual Activity Tolerance excellent   Current Activity Tolerance good   Regular Exercise No   Equipment Currently Used at Home grab bar, tub/shower   Fall history within last six months no   General Information   Onset of Illness/Injury or Date of Surgery 06/26/24   Referring Physician Christal Vargas, KACIE   Patient/Family Therapy Goals Statement (PT) return to home   Pertinent History of Current Problem (include personal factors and/or comorbidities that impact the POC) Pt is a 73 year old male POD0 s/p R CHRIS   Existing Precautions/Restrictions no hip ADD past midline;no hip hyperextension;no pivoting or twisting;weight bearing   Weight-Bearing Status - RLE weight-bearing as tolerated   Cognition   Affect/Mental Status (Cognition) WFL   Orientation Status (Cognition) oriented x 4   Follows Commands (Cognition) WFL   Pain Assessment   Patient Currently in  Pain Yes, see Vital Sign flowsheet  (R hip pain (1/10) when ambulating)   Integumentary/Edema   Integumentary/Edema other (describe)   Integumentary/Edema Comments bruising on R LE   Posture    Posture Not impaired   Range of Motion (ROM)   Range of Motion ROM deficits secondary to surgical procedure   ROM Comment R hip flexion decreased   Strength (Manual Muscle Testing)   Strength (Manual Muscle Testing) Deficits observed during functional mobility   Strength Comments R hip flexion strength decreased, Pt could perform R SLR   Bed Mobility   Bed Mobility supine-sit   Impairments Contributing to Impaired Bed Mobility pain;decreased ROM;decreased strength   Comment, (Bed Mobility) Ash[place] of [facility] for UE   Transfers   Transfers sit-stand transfer   Maintains Weight-bearing Status (Transfers) able to maintain   Impairments Contributing to Impaired Transfers pain;decreased ROM;decreased strength   Sit-Stand Transfer   Sit-Stand Blair (Transfers) contact guard   Assistive Device (Sit-Stand Transfers) walker, front-wheeled   Gait/Stairs (Locomotion)   Blair Level (Gait) contact guard   Assistive Device (Gait) walker, front-wheeled   Distance in Feet (Gait) 20   Pattern (Gait) step-through   Deviations/Abnormal Patterns (Gait) antalgic;gait speed decreased;stride length decreased;weight shifting decreased   Maintains Weight-bearing Status (Gait) able to maintain   Balance   Balance other (describe)   Balance Comments decreased balance while standing with FWW   Sensory Examination   Sensory Perception patient reports no sensory changes   Coordination   Coordination no deficits were identified   Muscle Tone   Muscle Tone no deficits were identified   Clinical Impression   Criteria for Skilled Therapeutic Intervention Yes, treatment indicated   PT Diagnosis (PT) decreased functional mobility   Influenced by the following impairments decrease ROM and strength of R LE and decrease balance while standing with FWW    Functional limitations due to impairments impaired bed mobility, transfers, gait, and stairs ability   Clinical Presentation (PT Evaluation Complexity) stable   Clinical Presentation Rationale Pt medically stable   Clinical Decision Making (Complexity) low complexity   Planned Therapy Interventions (PT) balance training;bed mobility training;cryotherapy;gait training;home exercise program;patient/family education;ROM (range of motion);stair training;strengthening;stretching;transfer training;progressive activity/exercise   Risk & Benefits of therapy have been explained evaluation/treatment results reviewed;care plan/treatment goals reviewed;risks/benefits reviewed;current/potential barriers reviewed;participants voiced agreement with care plan;participants included;patient;spouse/significant other   PT Total Evaluation Time   PT Eval, Low Complexity Minutes (52437) 10   Physical Therapy Goals   PT Frequency Daily   PT Predicted Duration/Target Date for Goal Attainment 06/28/24   PT Goals Bed Mobility;Transfers;Gait;Stairs   PT: Bed Mobility Supervision/stand-by assist;Within precautions;Supine to/from sit   PT: Transfers Assistive device;Supervision/stand-by assist;Within precautions   PT: Gait Supervision/stand-by assist;Rolling walker;100 feet;Within precautions   PT: Stairs Supervision/stand-by assist;3 stairs;Assistive device;Within precautions   PT Discharge Planning   PT Plan progress bed mobility, transfers, ambulation distance, assess stairs; issue HEP   PT Rationale for DC Rec Defer to ortho - pt is near baseline functional mobility.  Pt currently requires A x 1, anticipate with  continued IP PT will progress to return home.  Pt has support system of functionally sufficient spouse within household and daughter who can provide support.   PT Brief overview of current status A x1 with FWW   Total Session Time   Timed Code Treatment Minutes 30   Total Session Time (sum of timed and untimed services) 40

## 2024-06-28 LAB
ATRIAL RATE - MUSE: NORMAL BPM
DIASTOLIC BLOOD PRESSURE - MUSE: NORMAL MMHG
INTERPRETATION ECG - MUSE: NORMAL
P AXIS - MUSE: NORMAL DEGREES
PR INTERVAL - MUSE: NORMAL MS
QRS DURATION - MUSE: 168 MS
QT - MUSE: 412 MS
QTC - MUSE: 486 MS
R AXIS - MUSE: -68 DEGREES
SYSTOLIC BLOOD PRESSURE - MUSE: NORMAL MMHG
T AXIS - MUSE: 25 DEGREES
VENTRICULAR RATE- MUSE: 84 BPM

## (undated) DEVICE — GLOVE BIOGEL PI MICRO SZ 6.0 48560

## (undated) DEVICE — DRSG TEGADERM 4X10" 1627

## (undated) DEVICE — KIT NVG EXACTECHGPS V2 RBTC STRL

## (undated) DEVICE — BAG CLEAR TRASH 1.3M 39X33" P4040C

## (undated) DEVICE — SOL NACL 0.9% IRRIG 3000ML BAG 2B7477

## (undated) DEVICE — ESU ELEC BLADE 6" COATED E1450-6

## (undated) DEVICE — LINEN FULL SHEET 5511

## (undated) DEVICE — SPONGE LAP 18X18" X8435

## (undated) DEVICE — SHOULDER GPS THREADED PIN KIT

## (undated) DEVICE — CLOSURE DEVICE ZIPLINE 16CM ZIP16 PS1160

## (undated) DEVICE — GLOVE BIOGEL PI MICRO INDICATOR UNDERGLOVE SZ 8.0 48980

## (undated) DEVICE — BLADE SAW SAGITTAL STRK 18X90X1.27MM HD SYS 6 6118-127-090

## (undated) DEVICE — LINEN HALF SHEET 5512

## (undated) DEVICE — SU MONOCRYL 2-0 CT-1 36" UND Y945H

## (undated) DEVICE — ESU GROUND PAD ADULT W/CORD E7507

## (undated) DEVICE — HOOD FLYTE W/PEELAWAY 408-800-100

## (undated) DEVICE — PACK SET-UP STD 9102

## (undated) DEVICE — ESU PENCIL W/SMOKE EVAC CVPLP2000

## (undated) DEVICE — DRAPE CONVERTORS U-DRAPE 60X72" 8476

## (undated) DEVICE — SUTURE MONOCRYL+ 3-0 PS-1 27" UNDYED MCP936H

## (undated) DEVICE — DRAPE STOCKINETTE IMPERVIOUS 12" 1587

## (undated) DEVICE — GLOVE BIOGEL PI SZ 7.5 40875

## (undated) DEVICE — SU VICRYL 0 CT-2 27" UND J270H

## (undated) DEVICE — SUCTION MANIFOLD NEPTUNE 2 SYS 4 PORT 0702-020-000

## (undated) DEVICE — SU NDL MAYO TROCAR 217005

## (undated) DEVICE — SU ETHIBOND 1 CT-1 30" X425H

## (undated) DEVICE — DRAPE IOBAN INCISE 23X17" 6650EZ

## (undated) DEVICE — GLOVE BIOGEL PI MICRO INDICATOR UNDERGLOVE SZ 7.5 48975

## (undated) DEVICE — ESU ELEC BLADE 6" COATED/INSULATED E1455-6

## (undated) DEVICE — PACK SHOULDER RIDGES

## (undated) DEVICE — BLADE SAW SAGITTAL STRK 25X90X1.27MM HD SYS 6 6125-127-090

## (undated) DEVICE — SU MONOCRYL 3-0 PS-1 27" Y936H

## (undated) DEVICE — GLOVE BIOGEL PI MICRO INDICATOR UNDERGLOVE SZ 6.5 48965

## (undated) DEVICE — IMM SHOULDER  ABDUCT ULTRASLING III LG 11-0449-4

## (undated) DEVICE — KIT SHOULDER POSITIONING BEACH CHAIR ARM HOLDER AR-1644

## (undated) DEVICE — LINEN ORTHO ACL PACK 5447

## (undated) DEVICE — GLOVE BIOGEL PI ULTRATOUCH SZ 6.5 41165

## (undated) DEVICE — SET HANDPIECE INTERPULSE W/COAXIAL FAN SPRAY TIP 0210118000

## (undated) DEVICE — SU VICRYL 1 CT 36" J959H

## (undated) DEVICE — DRSG STERI STRIP 1/2X4" R1547

## (undated) DEVICE — SU ETHIBOND 5 V-37 4X30" MB66G

## (undated) DEVICE — DRSG KERLIX FLUFFS X5

## (undated) DEVICE — DRAPE ARTHROSCOPY SHOULDER BEACHCHAIR 29369

## (undated) DEVICE — GOWN XXL 9575

## (undated) DEVICE — SU DERMABOND ADVANCED .7ML DNX12

## (undated) DEVICE — GLOVE BIOGEL PI SZ 8.0 40880

## (undated) DEVICE — DRAPE STERI U 1015

## (undated) DEVICE — BRUSH SURGICAL SCRUB W/4% CHG SOL 25ML 371073

## (undated) DEVICE — DEVICE RETRIEVER HEWSON 71111579

## (undated) DEVICE — SU STRATAFIX PDS PLUS 1 CT-1 12" SXPP1A443

## (undated) DEVICE — BLADE KNIFE SURG 10 371110

## (undated) DEVICE — PREP CHLORAPREP 26ML TINTED HI-LITE ORANGE 930815

## (undated) DEVICE — SU VICRYL 1 CT-1 27" J341H

## (undated) DEVICE — GLOVE BIOGEL PI SZ 6.0 40860

## (undated) DEVICE — Device

## (undated) DEVICE — PACK TOTAL HIP RIDGES LATEX PO15HIFSG

## (undated) DEVICE — LINEN DRAPE 54X72" 5467

## (undated) DEVICE — DRSG GAUZE 4X4" TRAY

## (undated) DEVICE — BONE CLEANING TIP INTERPULSE FEMORAL CANAL 0210-008-000

## (undated) RX ORDER — TRANEXAMIC ACID 10 MG/ML
INJECTION, SOLUTION INTRAVENOUS
Status: DISPENSED
Start: 2024-06-26

## (undated) RX ORDER — ONDANSETRON 2 MG/ML
INJECTION INTRAMUSCULAR; INTRAVENOUS
Status: DISPENSED
Start: 2024-06-26

## (undated) RX ORDER — DEXAMETHASONE SODIUM PHOSPHATE 4 MG/ML
INJECTION, SOLUTION INTRA-ARTICULAR; INTRALESIONAL; INTRAMUSCULAR; INTRAVENOUS; SOFT TISSUE
Status: DISPENSED
Start: 2024-06-26

## (undated) RX ORDER — TRANEXAMIC ACID 10 MG/ML
INJECTION, SOLUTION INTRAVENOUS
Status: DISPENSED
Start: 2023-11-01

## (undated) RX ORDER — PHENYLEPHRINE HYDROCHLORIDE 10 MG/ML
INJECTION INTRAVENOUS
Status: DISPENSED
Start: 2023-11-01

## (undated) RX ORDER — LIDOCAINE HYDROCHLORIDE 10 MG/ML
INJECTION, SOLUTION EPIDURAL; INFILTRATION; INTRACAUDAL; PERINEURAL
Status: DISPENSED
Start: 2024-06-26

## (undated) RX ORDER — PROPOFOL 10 MG/ML
INJECTION, EMULSION INTRAVENOUS
Status: DISPENSED
Start: 2023-11-01

## (undated) RX ORDER — METOPROLOL TARTRATE 1 MG/ML
INJECTION, SOLUTION INTRAVENOUS
Status: DISPENSED
Start: 2024-06-26

## (undated) RX ORDER — VANCOMYCIN HYDROCHLORIDE 1 G/20ML
INJECTION, POWDER, LYOPHILIZED, FOR SOLUTION INTRAVENOUS
Status: DISPENSED
Start: 2024-06-26

## (undated) RX ORDER — FENTANYL CITRATE 50 UG/ML
INJECTION, SOLUTION INTRAMUSCULAR; INTRAVENOUS
Status: DISPENSED
Start: 2023-11-01

## (undated) RX ORDER — CEFAZOLIN SODIUM/WATER 2 G/20 ML
SYRINGE (ML) INTRAVENOUS
Status: DISPENSED
Start: 2024-06-26

## (undated) RX ORDER — PROPOFOL 10 MG/ML
INJECTION, EMULSION INTRAVENOUS
Status: DISPENSED
Start: 2024-06-26

## (undated) RX ORDER — FENTANYL CITRATE 50 UG/ML
INJECTION, SOLUTION INTRAMUSCULAR; INTRAVENOUS
Status: DISPENSED
Start: 2024-06-26

## (undated) RX ORDER — GLYCOPYRROLATE 0.2 MG/ML
INJECTION INTRAMUSCULAR; INTRAVENOUS
Status: DISPENSED
Start: 2023-11-01

## (undated) RX ORDER — DEXAMETHASONE SODIUM PHOSPHATE 4 MG/ML
INJECTION, SOLUTION INTRA-ARTICULAR; INTRALESIONAL; INTRAMUSCULAR; INTRAVENOUS; SOFT TISSUE
Status: DISPENSED
Start: 2023-11-01

## (undated) RX ORDER — LIDOCAINE HYDROCHLORIDE 10 MG/ML
INJECTION, SOLUTION EPIDURAL; INFILTRATION; INTRACAUDAL; PERINEURAL
Status: DISPENSED
Start: 2023-11-01

## (undated) RX ORDER — LABETALOL HYDROCHLORIDE 5 MG/ML
INJECTION, SOLUTION INTRAVENOUS
Status: DISPENSED
Start: 2024-06-26

## (undated) RX ORDER — CEFAZOLIN SODIUM/WATER 2 G/20 ML
SYRINGE (ML) INTRAVENOUS
Status: DISPENSED
Start: 2023-11-01

## (undated) RX ORDER — ONDANSETRON 2 MG/ML
INJECTION INTRAMUSCULAR; INTRAVENOUS
Status: DISPENSED
Start: 2023-11-01

## (undated) RX ORDER — FENTANYL CITRATE-0.9 % NACL/PF 10 MCG/ML
PLASTIC BAG, INJECTION (ML) INTRAVENOUS
Status: DISPENSED
Start: 2023-11-01

## (undated) RX ORDER — VANCOMYCIN HYDROCHLORIDE 1 G/20ML
INJECTION, POWDER, LYOPHILIZED, FOR SOLUTION INTRAVENOUS
Status: DISPENSED
Start: 2023-11-01

## (undated) RX ORDER — GLYCOPYRROLATE 0.2 MG/ML
INJECTION, SOLUTION INTRAMUSCULAR; INTRAVENOUS
Status: DISPENSED
Start: 2024-06-26